# Patient Record
Sex: MALE | Race: WHITE | NOT HISPANIC OR LATINO | URBAN - NONMETROPOLITAN AREA
[De-identification: names, ages, dates, MRNs, and addresses within clinical notes are randomized per-mention and may not be internally consistent; named-entity substitution may affect disease eponyms.]

---

## 2023-03-28 ENCOUNTER — CLINICAL SUPPORT (OUTPATIENT)
Dept: FAMILY MEDICINE CLINIC | Facility: CLINIC | Age: 43
End: 2023-03-28
Payer: MEDICAID

## 2023-03-28 DIAGNOSIS — Z01.818 PREOP TESTING: Primary | ICD-10-CM

## 2023-03-28 LAB
EXPIRATION DATE: NORMAL
FLUAV AG UPPER RESP QL IA.RAPID: NOT DETECTED
FLUBV AG UPPER RESP QL IA.RAPID: NOT DETECTED
INTERNAL CONTROL: NORMAL
Lab: NORMAL
SARS-COV-2 AG UPPER RESP QL IA.RAPID: NOT DETECTED

## 2023-03-28 PROCEDURE — 87428 SARSCOV & INF VIR A&B AG IA: CPT | Performed by: FAMILY MEDICINE

## 2023-07-10 PROBLEM — I25.119 CORONARY ARTERY DISEASE INVOLVING NATIVE HEART WITH ANGINA PECTORIS: Status: ACTIVE | Noted: 2023-07-10

## 2023-07-10 NOTE — NURSING NOTE
ACC REVIEW REPORT: [facility]        PATIENT NAME: Rm Noel    PATIENT ID: [id number]        COVID-19 ACC SCREENING       DOES THE PATIENT HAVE A FEVER GREATER THAN OR EQUAL .4: no    IS THE PATIENT EXPERIENCING SHORTNESS OF BREATH: no    DOES THE PATIENT HAVE A COUGH: no  DOES THE PATIENT HAVE ANY OF THE FOLLOWING RISK FACTORS:    EXPOSURE TO SUSPECTED OR KNOWN COVID-19: no    RECENT TRAVEL HISTORY TO ENDEMIC AREA (DOMESTIC/LOCAL): no    IS THE PATIENT A HEALTHCARE WORKER: no    HAS THE PATIENT EXPERIENCED A LOSS OF SENSE OF TASTE OR SMELL: no    HAS THE PATIENT BEEN TESTED FOR COVID-19:     DATE TESTED:     LAB TESTING SENT TO:           BED: S477    BED TYPE: tele    BED GIVEN TO: pt    TIME BED GIVEN: 1815    TODAY'S DATE: 7/10/2023    TRANSFER DATE: 7/10/23    ETA: TBD    TRANSFERRING FACILITY: [facility]    TRANSFERRING FACILITY PHONE # : [phone]    TRANSFERRING MD: MARIN Hernandez    DATE/TIME REQUEST RECEIVED: 7/10/23 1825    [facility] RN: LAMIN Miller    REPORT FROM: LAMIN Gracia    TIME REPORT TAKEN: 1828    DIAGNOSIS: CAD    REASON FOR TRANSFER TO [facility]: surgery    TRANSPORTATION: EMS    CLINICAL REASON FOR TRANSFER TO [facility]: surgery      CLINICAL INFORMATION    HEIGHT: 177 cm    WEIGHT: 96 kg    ALLERGIES: NKDA    INFECTIOUS DISEASE:     ISOLATION:     VITAL SIGNS:   TIME: 1800  TEMP: 98.1  PULSE: 71  B/P: 147/77  RESP: 18        LAB INFORMATION:     CULTURE INFORMATION:     MEDS/IV FLUIDS: 20g LFA, 20g RAC      CARDIAC SYSTEM:    CHEST PAIN: not at time of report    RATE:     SCALE:     RHYTHM: sinus    Is patient taking or has patient been given any drugs that could increase bleeding? yes    DRUG: ASA     DOSE/FREQUENCY: 81 mg qd    TROPONIN:    DATE:   TIME:   TROP:     DATE:   TIME:   TROP:       HEART CATH: 7/10/23    HEART CATH DATE: 7/10/23    HEART CATH RESULTS: multiple blockages    LAD:   RCA:   CX:   LMAIN:   EF:     SWAN:     SITE:   SIZE:    DATE INSERTED:     ARTLINE:      SITE:   SIZE:   DATE INSERTED:     SHEATH:    SITE:   SIZE:   DATE INSERTED:       VASOSEAL:    SITE:   DATE INSERTED:       EXTERNAL PACEMAKER:     RATE:   EXT PACER ON:       MODE:    DATE INSERTED:   OUTPUT SETTING:   SENSITIVITY SETTING:   SENSITIVITY TYPE:       IABP:    SITE:   AUG PRESSURE:   DATE INSERTED:     CARDIAC NOTES: Patient had heart cath today.  Right wrist TR Band.  Band is currently off.  Site is CDI      RESPIRATORY SYSTEM:    LUNG SOUNDS: clear    ABG DATE:         ABG TIME:     ABG RESULTS:    PH: PCO2:   PO2:   HCO3:   O2 SAT:       ETT SIZE:     ORAL:     NASAL:     SECURED AT MEASUREMENT (CM):     ON VENTILATOR:    TV:   FI02:   RATE:   PEEP:     OXYGEN: room air    O2 SAT: 98%    IMAGING FINDINGS:     PNEUMO CHEST TUBE SEAL TYPE:     RESPIRATORY STATUS:       CNS/MUSCULOSKELETAL    ALERT AND ORIENTED:    PERSON: yes  PLACE: yes  TIME: yes    INJURY:  WHERE:     SPENCER COMA SCALE:    E:   M:   V:     STROKE SCALE:     SIZE OF HEMORRHAGE:     SYMPTOMS: (CHOOSE APPROPRIATE)    ASPHASIA:   ATAXIA:   DYSARTHRIA:   DYSPHASIA:   HEADACHE:   PARALYSIS:   SEIZURE:   SYNCOPE:   VERTIGO:   VISION CHANGE:        EXTREMITY WEAKNESS:    LEFT ARM:   RIGHT ARM:   LEFT LEG:   RIGHT LEG:     CAT SCAN RESULTS:     MRI RESULTS:     CNS/MUSCULOSKELETAL NOTES:       GI//GY      ABDOMINAL PAIN:     VOMITING:     DIARRHEA:     NAUSEA:     BOWEL SOUNDS:     OCCULT STOOL:     HEMATURIA:     NG TUBE:    SIZE:     DATE INSERTED:       ULTRASOUND RESULTS:     ACUTE ABDOMEN RESULTS:     CT SCAN RESULTS:       GI//GY NOTES:     CALE:     PAST MEDICAL HISTORY: hyperlipidemia, lymphoma as a child    OTHER SYMPTOM NOTES:     ADDITIONAL NOTES:           Angela Mancilla RN  7/10/2023  18:23 EDT

## 2023-07-11 ENCOUNTER — HOSPITAL ENCOUNTER (INPATIENT)
Facility: HOSPITAL | Age: 43
LOS: 7 days | Discharge: HOME | DRG: 236 | End: 2023-07-18
Attending: THORACIC SURGERY (CARDIOTHORACIC VASCULAR SURGERY) | Admitting: THORACIC SURGERY (CARDIOTHORACIC VASCULAR SURGERY)
Payer: MEDICAID

## 2023-07-11 DIAGNOSIS — Z95.1 S/P CABG (CORONARY ARTERY BYPASS GRAFT): Primary | ICD-10-CM

## 2023-07-11 PROBLEM — I25.10 CAD (CORONARY ARTERY DISEASE): Status: ACTIVE | Noted: 2023-07-11

## 2023-07-11 LAB
AMPHET+METHAMPHET UR QL: NEGATIVE
AMPHETAMINES UR QL: NEGATIVE
BARBITURATES UR QL SCN: NEGATIVE
BENZODIAZ UR QL SCN: NEGATIVE
BUPRENORPHINE SERPL-MCNC: NEGATIVE NG/ML
CANNABINOIDS SERPL QL: POSITIVE
COCAINE UR QL: NEGATIVE
FENTANYL UR-MCNC: NEGATIVE NG/ML
METHADONE UR QL SCN: NEGATIVE
OPIATES UR QL: NEGATIVE
OXYCODONE UR QL SCN: NEGATIVE
PCP UR QL SCN: NEGATIVE
PROPOXYPH UR QL: NEGATIVE
TRICYCLICS UR QL SCN: NEGATIVE

## 2023-07-11 PROCEDURE — 99223 1ST HOSP IP/OBS HIGH 75: CPT | Performed by: THORACIC SURGERY (CARDIOTHORACIC VASCULAR SURGERY)

## 2023-07-11 PROCEDURE — 80307 DRUG TEST PRSMV CHEM ANLYZR: CPT | Performed by: THORACIC SURGERY (CARDIOTHORACIC VASCULAR SURGERY)

## 2023-07-11 RX ORDER — ATORVASTATIN CALCIUM 40 MG/1
40 TABLET, FILM COATED ORAL NIGHTLY
Status: DISCONTINUED | OUTPATIENT
Start: 2023-07-11 | End: 2023-07-13 | Stop reason: SDUPTHER

## 2023-07-11 RX ORDER — ONDANSETRON 4 MG/1
4 TABLET, FILM COATED ORAL EVERY 6 HOURS PRN
Status: DISCONTINUED | OUTPATIENT
Start: 2023-07-11 | End: 2023-07-18 | Stop reason: HOSPADM

## 2023-07-11 RX ORDER — BISACODYL 10 MG
10 SUPPOSITORY, RECTAL RECTAL DAILY PRN
Status: DISCONTINUED | OUTPATIENT
Start: 2023-07-11 | End: 2023-07-11

## 2023-07-11 RX ORDER — ONDANSETRON 2 MG/ML
4 INJECTION INTRAMUSCULAR; INTRAVENOUS EVERY 6 HOURS PRN
Status: DISCONTINUED | OUTPATIENT
Start: 2023-07-11 | End: 2023-07-18 | Stop reason: HOSPADM

## 2023-07-11 RX ORDER — BISACODYL 5 MG/1
5 TABLET, DELAYED RELEASE ORAL DAILY PRN
Status: DISCONTINUED | OUTPATIENT
Start: 2023-07-11 | End: 2023-07-11

## 2023-07-11 RX ORDER — SODIUM CHLORIDE 0.9 % (FLUSH) 0.9 %
10 SYRINGE (ML) INJECTION AS NEEDED
Status: DISCONTINUED | OUTPATIENT
Start: 2023-07-11 | End: 2023-07-13

## 2023-07-11 RX ORDER — ATORVASTATIN CALCIUM 40 MG/1
40 TABLET, FILM COATED ORAL DAILY
COMMUNITY
Start: 2023-05-09

## 2023-07-11 RX ORDER — ASPIRIN 81 MG/1
81 TABLET ORAL DAILY
Status: DISCONTINUED | OUTPATIENT
Start: 2023-07-11 | End: 2023-07-13 | Stop reason: SDUPTHER

## 2023-07-11 RX ORDER — ACETAMINOPHEN 325 MG/1
650 TABLET ORAL EVERY 4 HOURS PRN
Status: DISCONTINUED | OUTPATIENT
Start: 2023-07-11 | End: 2023-07-18 | Stop reason: HOSPADM

## 2023-07-11 RX ORDER — SALICYLIC ACID 40 %
81 ADHESIVE PATCH, MEDICATED TOPICAL DAILY
COMMUNITY
Start: 2023-05-09 | End: 2023-07-18 | Stop reason: HOSPADM

## 2023-07-11 RX ORDER — SODIUM CHLORIDE 9 MG/ML
40 INJECTION, SOLUTION INTRAVENOUS AS NEEDED
Status: DISCONTINUED | OUTPATIENT
Start: 2023-07-11 | End: 2023-07-13

## 2023-07-11 RX ORDER — SODIUM CHLORIDE 0.9 % (FLUSH) 0.9 %
10 SYRINGE (ML) INJECTION EVERY 12 HOURS SCHEDULED
Status: DISCONTINUED | OUTPATIENT
Start: 2023-07-11 | End: 2023-07-13

## 2023-07-11 RX ORDER — POLYETHYLENE GLYCOL 3350 17 G/17G
17 POWDER, FOR SOLUTION ORAL DAILY PRN
Status: DISCONTINUED | OUTPATIENT
Start: 2023-07-11 | End: 2023-07-11

## 2023-07-11 RX ORDER — AMOXICILLIN 250 MG
2 CAPSULE ORAL 2 TIMES DAILY
Status: DISCONTINUED | OUTPATIENT
Start: 2023-07-11 | End: 2023-07-11

## 2023-07-11 RX ADMIN — ATORVASTATIN CALCIUM 40 MG: 40 TABLET, FILM COATED ORAL at 21:16

## 2023-07-11 RX ADMIN — Medication 10 ML: at 21:15

## 2023-07-11 RX ADMIN — Medication 10 ML: at 08:36

## 2023-07-11 RX ADMIN — ASPIRIN 81 MG: 81 TABLET, COATED ORAL at 08:36

## 2023-07-11 NOTE — PLAN OF CARE
Goal Outcome Evaluation:              Outcome Evaluation: pt axo, vss, no complaints of chest pain, room air, no complaints of pain, awiaing cabg.

## 2023-07-11 NOTE — CASE MANAGEMENT/SOCIAL WORK
Discharge Planning Assessment  [facility]     Patient Name: Rm Noel  MRN: [id number]  Today's Date: 7/11/2023    Admit Date: 7/11/2023    Plan: Home   Discharge Needs Assessment       Row Name 07/11/23 1042       Living Environment    People in Home child(jacinda), adult    Current Living Arrangements home    Potentially Unsafe Housing Conditions none    Primary Care Provided by self    Provides Primary Care For no one    Family Caregiver if Needed child(jacinda), adult    Living Arrangement Comments LIves in [facility] w/daughter in  a home.       Resource/Environmental Concerns    Resource/Environmental Concerns none       Food Insecurity    Within the past 12 months, you worried that your food would run out before you got the money to buy more. Never true    Within the past 12 months, the food you bought just didn't last and you didn't have money to get more. Never true       Transition Planning    Patient/Family Anticipates Transition to home with family    Transportation Anticipated family or friend will provide       Discharge Needs Assessment    Equipment Currently Used at Home cpap                   Discharge Plan       Row Name 07/11/23 1044       Plan    Plan Home    Patient/Family in Agreement with Plan yes    Plan Comments I met w/Mr. Noel and daughter @ the bedside to initiate d/c plan. Insurance confirmed. Fills scripts w/no issues @ [facility]. Not current [facility]. Uses a Cpap @ baseline. Has PCP Rigoberto Solano. CABG planned. D/C plan @ this time is home w/family. No d/c needs @ this time. CM will continue to follow thru admission.    Final Discharge Disposition Code 01 - home or self-care                  Continued Care and Services - Admitted Since 7/11/2023    Coordination has not been started for this encounter.       Expected Discharge Date and Time       Expected Discharge Date Expected Discharge Time    Jul 19, 2023            Demographic Summary       Row Name 07/11/23 1041       General  Information    Arrived From hospital    Referral Source emergency department    Preferred Language English    General Information Comments No POA/LW paperwork on file. PCP is Rigobertomaureen Solano                   Functional Status       Row Name 07/11/23 1042       Functional Status    Usual Activity Tolerance good    Current Activity Tolerance moderate       Physical Activity    On average, how many days per week do you engage in moderate to strenuous exercise (like a brisk walk)? 5 days    On average, how many minutes do you engage in exercise at this level? 20 min    Number of minutes of exercise per week 100       Functional Status, IADL    Medications independent    Meal Preparation independent    Housekeeping independent    Laundry independent    Shopping independent       Mental Status    General Appearance WDL WDL       Employment/    Employment Status employed full-time                   Psychosocial    No documentation.                  Abuse/Neglect    No documentation.                  Legal    No documentation.                  Substance Abuse    No documentation.                  Patient Forms    No documentation.                     Lyle Soto RN

## 2023-07-11 NOTE — H&P
Cardiothoracic Surgery History & Physical           Chief complaint: Chest pain    HPI: Rm Noel is a 43-year-old current smoker with no past medical history that he is aware of who presents as a transfer from [facility] for coronary artery disease.  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  He started to see his PCP who ordered a cardiac work-up.  After an abnormal stress test, he was referred for cardiac cath.  Cardiac cath showed 95 to 99% proximal RCA stenosis with 100% occluded middle segment of RCA, 40% proximal stenosis of the LAD with 100% mid segment occlusion, first diagonal with 90% proximal stenosis, second obtuse marginal with 90% stenosis.  Patient was then transferred to [facility] for possible CABG with Dr. Hernandez.  Patient states that he has not had any further episodes of chest pain since his initial episode in November.  He denies any lower extremity edema.  He denies any history of radiation to the chest.  He does report a cosmetic procedure to his right chest when he was a child.      History reviewed. No pertinent past medical history.  History reviewed. No pertinent family history.  Social History     Tobacco Use    Smoking status: Every Day     Packs/day: 2.00     Years: 15.00     Pack years: 30.00     Types: Cigarettes     Start date: 7/1/1994    Smokeless tobacco: Never   Vaping Use    Vaping Use: Never used   Substance Use Topics    Alcohol use: Never    Drug use: Yes     Types: Marijuana       Medications Prior to Admission   Medication Sig Dispense Refill Last Dose    atorvastatin (LIPITOR) 40 MG tablet Take 1 tablet by mouth Daily.   Past Week    [facility] Aspirin Low Dose 81 MG EC tablet Take 1 tablet by mouth Daily.   Past Week     Allergies:  Patient has no known allergies.    Review of Systems:  A comprehensive review of systems was negative except for:   \  Cardiovascular: Chest pain as  described in HPI  All other systems were reviewed and were negative.    Vital Signs:  Temp:  [97.8 °F (36.6 °C)] 97.8 °F (36.6 °C)  Heart Rate:  [61-72] 61  Resp:  [18] 18  BP: (155)/(91) 155/91    Physical Exam:  Physical Exam  Constitutional:       Appearance: Normal appearance.   HENT:      Head: Normocephalic and atraumatic.   Eyes:      Extraocular Movements: Extraocular movements intact.   Cardiovascular:      Rate and Rhythm: Normal rate and regular rhythm.      Pulses:           Dorsalis pedis pulses are 2+ on the right side and 2+ on the left side.        Posterior tibial pulses are 2+ on the right side and 2+ on the left side.      Heart sounds: Normal heart sounds. No murmur heard.    No friction rub.   Pulmonary:      Effort: Pulmonary effort is normal. No respiratory distress.      Breath sounds: No wheezing or rhonchi.   Abdominal:      General: There is no distension.      Palpations: Abdomen is soft.      Tenderness: There is no abdominal tenderness.   Musculoskeletal:      Right lower leg: No edema.      Left lower leg: No edema.   Neurological:      Mental Status: He is alert.           Imaging:   Cardiac cath images uploaded in chart      Assessment:     CAD (coronary artery disease)         Plan:   Plan for CABG with Dr. Hernandez  Patient will need preoperative echo and carotid duplex and PFTs       ERIKA Hernandez agree the above I saw this patient and evaluated the patient and reviewed the heart catheterization.  Also discussed the case with his cardiologist.  I agree his best option at his age with his disease is coronary bypass grafting surgery.  Patient is agreeable to proceed he is aware of the risk of stroke bleeding infection death renal failure and agrees to proceed      Eelni Ulrich PA-C  07/11/23  07:21 EDT

## 2023-07-12 ENCOUNTER — APPOINTMENT (OUTPATIENT)
Dept: CARDIOLOGY | Facility: HOSPITAL | Age: 43
DRG: 236 | End: 2023-07-12
Payer: MEDICAID

## 2023-07-12 ENCOUNTER — APPOINTMENT (OUTPATIENT)
Dept: GENERAL RADIOLOGY | Facility: HOSPITAL | Age: 43
DRG: 236 | End: 2023-07-12
Payer: MEDICAID

## 2023-07-12 ENCOUNTER — APPOINTMENT (OUTPATIENT)
Dept: PULMONOLOGY | Facility: HOSPITAL | Age: 43
DRG: 236 | End: 2023-07-12
Payer: MEDICAID

## 2023-07-12 LAB
ABO GROUP BLD: NORMAL
ABO GROUP BLD: NORMAL
ALBUMIN SERPL-MCNC: 4.6 G/DL (ref 3.5–5.2)
ALBUMIN/GLOB SERPL: 1.5 G/DL
ALP SERPL-CCNC: 87 U/L (ref 39–117)
ALT SERPL W P-5'-P-CCNC: 32 U/L (ref 1–41)
ANION GAP SERPL CALCULATED.3IONS-SCNC: 13 MMOL/L (ref 5–15)
APTT PPP: 33.9 SECONDS (ref 22–39)
ASCENDING AORTA: 2.7 CM
AST SERPL-CCNC: 23 U/L (ref 1–40)
BH CV ECHO LEFT VENTRICLE MID CAVITARY GRADIENT: 8 MMHG
BH CV ECHO MEAS - AO MAX PG: 8.6 MMHG
BH CV ECHO MEAS - AO MEAN PG: 4.3 MMHG
BH CV ECHO MEAS - AO ROOT DIAM: 3.3 CM
BH CV ECHO MEAS - AO V2 MAX: 146.7 CM/SEC
BH CV ECHO MEAS - AO V2 VTI: 25.5 CM
BH CV ECHO MEAS - AVA(I,D): 2.6 CM2
BH CV ECHO MEAS - EDV(CUBED): 47.3 ML
BH CV ECHO MEAS - EDV(MOD-SP2): 84.8 ML
BH CV ECHO MEAS - EDV(MOD-SP4): 94.7 ML
BH CV ECHO MEAS - EF(MOD-BP): 60 %
BH CV ECHO MEAS - EF(MOD-SP2): 62.7 %
BH CV ECHO MEAS - EF(MOD-SP4): 55.6 %
BH CV ECHO MEAS - ESV(CUBED): 15.2 ML
BH CV ECHO MEAS - ESV(MOD-SP2): 31.7 ML
BH CV ECHO MEAS - ESV(MOD-SP4): 42.1 ML
BH CV ECHO MEAS - FS: 31.5 %
BH CV ECHO MEAS - IVS/LVPW: 0.91 CM
BH CV ECHO MEAS - IVSD: 1.28 CM
BH CV ECHO MEAS - LA DIMENSION: 3.5 CM
BH CV ECHO MEAS - LAT PEAK E' VEL: 10 CM/SEC
BH CV ECHO MEAS - LV DIASTOLIC VOL/BSA (35-75): 44.8 CM2
BH CV ECHO MEAS - LV MASS(C)D: 169.8 GRAMS
BH CV ECHO MEAS - LV MAX PG: 5.2 MMHG
BH CV ECHO MEAS - LV MEAN PG: 2.6 MMHG
BH CV ECHO MEAS - LV SYSTOLIC VOL/BSA (12-30): 19.9 CM2
BH CV ECHO MEAS - LV V1 MAX: 113.6 CM/SEC
BH CV ECHO MEAS - LV V1 VTI: 20.6 CM
BH CV ECHO MEAS - LVIDD: 3.6 CM
BH CV ECHO MEAS - LVIDS: 2.48 CM
BH CV ECHO MEAS - LVOT AREA: 3.3 CM2
BH CV ECHO MEAS - LVOT DIAM: 2.04 CM
BH CV ECHO MEAS - LVPWD: 1.41 CM
BH CV ECHO MEAS - MED PEAK E' VEL: 7 CM/SEC
BH CV ECHO MEAS - MV A MAX VEL: 55.3 CM/SEC
BH CV ECHO MEAS - MV DEC SLOPE: 143.8 CM/SEC2
BH CV ECHO MEAS - MV DEC TIME: 0.38 MSEC
BH CV ECHO MEAS - MV E MAX VEL: 54.4 CM/SEC
BH CV ECHO MEAS - MV E/A: 0.98
BH CV ECHO MEAS - MV MAX PG: 2.17 MMHG
BH CV ECHO MEAS - MV MEAN PG: 1 MMHG
BH CV ECHO MEAS - MV P1/2T: 114 MSEC
BH CV ECHO MEAS - MV V2 VTI: 23.8 CM
BH CV ECHO MEAS - MVA(VTI): 2.8 CM2
BH CV ECHO MEAS - PA ACC TIME: 0.19 SEC
BH CV ECHO MEAS - PA V2 MAX: 110.9 CM/SEC
BH CV ECHO MEAS - RAP SYSTOLE: 3 MMHG
BH CV ECHO MEAS - RVSP: 22.3 MMHG
BH CV ECHO MEAS - SI(MOD-SP2): 25.2 ML/M2
BH CV ECHO MEAS - SI(MOD-SP4): 24.9 ML/M2
BH CV ECHO MEAS - SV(LVOT): 67.4 ML
BH CV ECHO MEAS - SV(MOD-SP2): 53.2 ML
BH CV ECHO MEAS - SV(MOD-SP4): 52.6 ML
BH CV ECHO MEAS - TAPSE (>1.6): 1.8 CM
BH CV ECHO MEAS - TR MAX PG: 19.3 MMHG
BH CV ECHO MEAS - TR MAX VEL: 219.8 CM/SEC
BH CV ECHO MEASUREMENTS AVERAGE E/E' RATIO: 6.4
BH CV VAS BP LEFT ARM: NORMAL MMHG
BH CV XLRA - RV BASE: 3.5 CM
BH CV XLRA - RV LENGTH: 7.9 CM
BH CV XLRA - RV MID: 3.5 CM
BH CV XLRA - TDI S': 17 CM/SEC
BH CV XLRA MEAS LEFT DIST CCA EDV: 34 CM/SEC
BH CV XLRA MEAS LEFT DIST CCA PSV: 149 CM/SEC
BH CV XLRA MEAS LEFT DIST ICA EDV: 29.1 CM/SEC
BH CV XLRA MEAS LEFT DIST ICA PSV: 76.8 CM/SEC
BH CV XLRA MEAS LEFT ICA/CCA RATIO: 0.54
BH CV XLRA MEAS LEFT MID CCA EDV: 32.9 CM/SEC
BH CV XLRA MEAS LEFT MID CCA PSV: 173 CM/SEC
BH CV XLRA MEAS LEFT MID ICA EDV: 35.3 CM/SEC
BH CV XLRA MEAS LEFT MID ICA PSV: 84.7 CM/SEC
BH CV XLRA MEAS LEFT PROX CCA EDV: 32.9 CM/SEC
BH CV XLRA MEAS LEFT PROX CCA PSV: 172 CM/SEC
BH CV XLRA MEAS LEFT PROX ECA EDV: 22.4 CM/SEC
BH CV XLRA MEAS LEFT PROX ECA PSV: 123 CM/SEC
BH CV XLRA MEAS LEFT PROX ICA EDV: 29 CM/SEC
BH CV XLRA MEAS LEFT PROX ICA PSV: 93.3 CM/SEC
BH CV XLRA MEAS LEFT PROX SCLA PSV: 285 CM/SEC
BH CV XLRA MEAS LEFT VERTEBRAL A EDV: 13.7 CM/SEC
BH CV XLRA MEAS LEFT VERTEBRAL A PSV: 83.4 CM/SEC
BH CV XLRA MEAS RIGHT DIST CCA EDV: 27.1 CM/SEC
BH CV XLRA MEAS RIGHT DIST CCA PSV: 131 CM/SEC
BH CV XLRA MEAS RIGHT DIST ICA EDV: 25 CM/SEC
BH CV XLRA MEAS RIGHT DIST ICA PSV: 71.1 CM/SEC
BH CV XLRA MEAS RIGHT ICA/CCA RATIO: 0.72
BH CV XLRA MEAS RIGHT MID CCA EDV: 26.1 CM/SEC
BH CV XLRA MEAS RIGHT MID CCA PSV: 151 CM/SEC
BH CV XLRA MEAS RIGHT MID ICA EDV: 29.4 CM/SEC
BH CV XLRA MEAS RIGHT MID ICA PSV: 87.6 CM/SEC
BH CV XLRA MEAS RIGHT PROX CCA EDV: 30.5 CM/SEC
BH CV XLRA MEAS RIGHT PROX CCA PSV: 165 CM/SEC
BH CV XLRA MEAS RIGHT PROX ECA EDV: 23.2 CM/SEC
BH CV XLRA MEAS RIGHT PROX ECA PSV: 176 CM/SEC
BH CV XLRA MEAS RIGHT PROX ICA EDV: 32.9 CM/SEC
BH CV XLRA MEAS RIGHT PROX ICA PSV: 109 CM/SEC
BH CV XLRA MEAS RIGHT PROX SCLA PSV: 273 CM/SEC
BH CV XLRA MEAS RIGHT VERTEBRAL A EDV: 14.3 CM/SEC
BH CV XLRA MEAS RIGHT VERTEBRAL A PSV: 61.9 CM/SEC
BILIRUB SERPL-MCNC: 0.7 MG/DL (ref 0–1.2)
BLD GP AB SCN SERPL QL: NEGATIVE
BUN SERPL-MCNC: 12 MG/DL (ref 6–20)
BUN/CREAT SERPL: 15 (ref 7–25)
CALCIUM SPEC-SCNC: 9.4 MG/DL (ref 8.6–10.5)
CHLORIDE SERPL-SCNC: 103 MMOL/L (ref 98–107)
CHOLEST SERPL-MCNC: 165 MG/DL (ref 0–200)
CO2 SERPL-SCNC: 22 MMOL/L (ref 22–29)
CREAT SERPL-MCNC: 0.8 MG/DL (ref 0.76–1.27)
DEPRECATED RDW RBC AUTO: 40.5 FL (ref 37–54)
EGFRCR SERPLBLD CKD-EPI 2021: 111.2 ML/MIN/1.73
ERYTHROCYTE [DISTWIDTH] IN BLOOD BY AUTOMATED COUNT: 13 % (ref 12.3–15.4)
GLOBULIN UR ELPH-MCNC: 3 GM/DL
GLUCOSE BLDC GLUCOMTR-MCNC: 117 MG/DL (ref 70–130)
GLUCOSE BLDC GLUCOMTR-MCNC: 124 MG/DL (ref 70–130)
GLUCOSE BLDC GLUCOMTR-MCNC: 142 MG/DL (ref 70–130)
GLUCOSE BLDC GLUCOMTR-MCNC: 85 MG/DL (ref 70–130)
GLUCOSE SERPL-MCNC: 167 MG/DL (ref 65–99)
HBA1C MFR BLD: 5.8 % (ref 4.8–5.6)
HCT VFR BLD AUTO: 45.3 % (ref 37.5–51)
HDLC SERPL-MCNC: 30 MG/DL (ref 40–60)
HGB BLD-MCNC: 14.7 G/DL (ref 13–17.7)
INR PPP: 0.96 (ref 0.89–1.12)
IVRT: 101 MSEC
LDLC SERPL CALC-MCNC: 109 MG/DL (ref 0–100)
LDLC/HDLC SERPL: 3.55 {RATIO}
LEFT ARM BP: NORMAL MMHG
LEFT ATRIUM VOLUME INDEX: 24 ML/M2
MCH RBC QN AUTO: 27.9 PG (ref 26.6–33)
MCHC RBC AUTO-ENTMCNC: 32.5 G/DL (ref 31.5–35.7)
MCV RBC AUTO: 86 FL (ref 79–97)
PA ADP PRP-ACNC: 139 PRU
PLATELET # BLD AUTO: 253 10*3/MM3 (ref 140–450)
PMV BLD AUTO: 9.4 FL (ref 6–12)
POTASSIUM SERPL-SCNC: 4.3 MMOL/L (ref 3.5–5.2)
PROT SERPL-MCNC: 7.6 G/DL (ref 6–8.5)
PROTHROMBIN TIME: 12.8 SECONDS (ref 12.2–14.5)
RBC # BLD AUTO: 5.27 10*6/MM3 (ref 4.14–5.8)
RH BLD: NEGATIVE
RH BLD: NEGATIVE
SODIUM SERPL-SCNC: 138 MMOL/L (ref 136–145)
T&S EXPIRATION DATE: NORMAL
TRIGL SERPL-MCNC: 143 MG/DL (ref 0–150)
VLDLC SERPL-MCNC: 26 MG/DL (ref 5–40)
WBC NRBC COR # BLD: 8.89 10*3/MM3 (ref 3.4–10.8)

## 2023-07-12 PROCEDURE — 80053 COMPREHEN METABOLIC PANEL: CPT | Performed by: STUDENT IN AN ORGANIZED HEALTH CARE EDUCATION/TRAINING PROGRAM

## 2023-07-12 PROCEDURE — 94010 BREATHING CAPACITY TEST: CPT | Performed by: INTERNAL MEDICINE

## 2023-07-12 PROCEDURE — 94010 BREATHING CAPACITY TEST: CPT

## 2023-07-12 PROCEDURE — 93880 EXTRACRANIAL BILAT STUDY: CPT | Performed by: INTERNAL MEDICINE

## 2023-07-12 PROCEDURE — 99024 POSTOP FOLLOW-UP VISIT: CPT | Performed by: THORACIC SURGERY (CARDIOTHORACIC VASCULAR SURGERY)

## 2023-07-12 PROCEDURE — 82948 REAGENT STRIP/BLOOD GLUCOSE: CPT

## 2023-07-12 PROCEDURE — 85576 BLOOD PLATELET AGGREGATION: CPT | Performed by: STUDENT IN AN ORGANIZED HEALTH CARE EDUCATION/TRAINING PROGRAM

## 2023-07-12 PROCEDURE — 85730 THROMBOPLASTIN TIME PARTIAL: CPT | Performed by: STUDENT IN AN ORGANIZED HEALTH CARE EDUCATION/TRAINING PROGRAM

## 2023-07-12 PROCEDURE — 86901 BLOOD TYPING SEROLOGIC RH(D): CPT | Performed by: STUDENT IN AN ORGANIZED HEALTH CARE EDUCATION/TRAINING PROGRAM

## 2023-07-12 PROCEDURE — 93010 ELECTROCARDIOGRAM REPORT: CPT | Performed by: INTERNAL MEDICINE

## 2023-07-12 PROCEDURE — 93005 ELECTROCARDIOGRAM TRACING: CPT | Performed by: STUDENT IN AN ORGANIZED HEALTH CARE EDUCATION/TRAINING PROGRAM

## 2023-07-12 PROCEDURE — 86900 BLOOD TYPING SEROLOGIC ABO: CPT | Performed by: STUDENT IN AN ORGANIZED HEALTH CARE EDUCATION/TRAINING PROGRAM

## 2023-07-12 PROCEDURE — 86901 BLOOD TYPING SEROLOGIC RH(D): CPT

## 2023-07-12 PROCEDURE — 93880 EXTRACRANIAL BILAT STUDY: CPT

## 2023-07-12 PROCEDURE — 80061 LIPID PANEL: CPT | Performed by: STUDENT IN AN ORGANIZED HEALTH CARE EDUCATION/TRAINING PROGRAM

## 2023-07-12 PROCEDURE — 93306 TTE W/DOPPLER COMPLETE: CPT | Performed by: INTERNAL MEDICINE

## 2023-07-12 PROCEDURE — 83036 HEMOGLOBIN GLYCOSYLATED A1C: CPT | Performed by: STUDENT IN AN ORGANIZED HEALTH CARE EDUCATION/TRAINING PROGRAM

## 2023-07-12 PROCEDURE — 71045 X-RAY EXAM CHEST 1 VIEW: CPT

## 2023-07-12 PROCEDURE — 85027 COMPLETE CBC AUTOMATED: CPT | Performed by: STUDENT IN AN ORGANIZED HEALTH CARE EDUCATION/TRAINING PROGRAM

## 2023-07-12 PROCEDURE — 86923 COMPATIBILITY TEST ELECTRIC: CPT

## 2023-07-12 PROCEDURE — 85610 PROTHROMBIN TIME: CPT | Performed by: STUDENT IN AN ORGANIZED HEALTH CARE EDUCATION/TRAINING PROGRAM

## 2023-07-12 PROCEDURE — 86900 BLOOD TYPING SEROLOGIC ABO: CPT

## 2023-07-12 PROCEDURE — 93306 TTE W/DOPPLER COMPLETE: CPT

## 2023-07-12 PROCEDURE — 86850 RBC ANTIBODY SCREEN: CPT | Performed by: STUDENT IN AN ORGANIZED HEALTH CARE EDUCATION/TRAINING PROGRAM

## 2023-07-12 RX ORDER — IPRATROPIUM BROMIDE AND ALBUTEROL SULFATE 2.5; .5 MG/3ML; MG/3ML
3 SOLUTION RESPIRATORY (INHALATION) EVERY 4 HOURS PRN
Status: DISCONTINUED | OUTPATIENT
Start: 2023-07-12 | End: 2023-07-18 | Stop reason: HOSPADM

## 2023-07-12 RX ORDER — CHLORHEXIDINE GLUCONATE 0.12 MG/ML
15 RINSE ORAL EVERY 12 HOURS
Status: COMPLETED | OUTPATIENT
Start: 2023-07-12 | End: 2023-07-13

## 2023-07-12 RX ORDER — SODIUM CHLORIDE 0.9 % (FLUSH) 0.9 %
10 SYRINGE (ML) INJECTION EVERY 12 HOURS SCHEDULED
Status: DISCONTINUED | OUTPATIENT
Start: 2023-07-12 | End: 2023-07-13

## 2023-07-12 RX ORDER — CHLORHEXIDINE GLUCONATE 500 MG/1
CLOTH TOPICAL EVERY 12 HOURS SCHEDULED
Status: DISCONTINUED | OUTPATIENT
Start: 2023-07-12 | End: 2023-07-13

## 2023-07-12 RX ORDER — SODIUM CHLORIDE 9 MG/ML
40 INJECTION, SOLUTION INTRAVENOUS AS NEEDED
Status: DISCONTINUED | OUTPATIENT
Start: 2023-07-12 | End: 2023-07-13

## 2023-07-12 RX ORDER — CALCIUM CARBONATE 500 MG/1
2 TABLET, CHEWABLE ORAL 3 TIMES DAILY PRN
Status: DISCONTINUED | OUTPATIENT
Start: 2023-07-12 | End: 2023-07-18 | Stop reason: HOSPADM

## 2023-07-12 RX ORDER — CHLORHEXIDINE GLUCONATE 0.12 MG/ML
15 RINSE ORAL ONCE
Status: CANCELLED | OUTPATIENT
Start: 2023-07-12 | End: 2023-07-12

## 2023-07-12 RX ORDER — SODIUM CHLORIDE 0.9 % (FLUSH) 0.9 %
10 SYRINGE (ML) INJECTION AS NEEDED
Status: DISCONTINUED | OUTPATIENT
Start: 2023-07-12 | End: 2023-07-13

## 2023-07-12 RX ORDER — CHLORHEXIDINE GLUCONATE 0.12 MG/ML
15 RINSE ORAL EVERY 12 HOURS
Status: DISCONTINUED | OUTPATIENT
Start: 2023-07-12 | End: 2023-07-12

## 2023-07-12 RX ORDER — CHLORHEXIDINE GLUCONATE 500 MG/1
CLOTH TOPICAL EVERY 12 HOURS PRN
Status: DISCONTINUED | OUTPATIENT
Start: 2023-07-12 | End: 2023-07-12

## 2023-07-12 RX ADMIN — ASPIRIN 81 MG: 81 TABLET, COATED ORAL at 10:14

## 2023-07-12 RX ADMIN — ATORVASTATIN CALCIUM 40 MG: 40 TABLET, FILM COATED ORAL at 20:01

## 2023-07-12 RX ADMIN — Medication 10 ML: at 20:05

## 2023-07-12 RX ADMIN — CHLORHEXIDINE GLUCONATE 15 ML: 1.2 SOLUTION ORAL at 20:01

## 2023-07-12 RX ADMIN — Medication 10 ML: at 10:14

## 2023-07-12 RX ADMIN — Medication 12.5 MG: at 20:00

## 2023-07-12 RX ADMIN — MUPIROCIN 1 APPLICATION: 20 OINTMENT TOPICAL at 20:01

## 2023-07-12 RX ADMIN — CALCIUM CARBONATE (ANTACID) CHEW TAB 500 MG 2 TABLET: 500 CHEW TAB at 20:01

## 2023-07-12 NOTE — CASE MANAGEMENT/SOCIAL WORK
Continued Stay Note  [facility]     Patient Name: Rm Noel  MRN: [id number]  Today's Date: 7/12/2023    Admit Date: 7/11/2023    Plan: Home   Discharge Plan       Row Name 07/12/23 1202       Plan    Plan Home    Patient/Family in Agreement with Plan yes    Plan Comments I met Mr. Noel in hallway ambulating, plan is for CABG tomorrow, Thursday. No d/c needs verbalized @ this time. CM will continue to follow.    Final Discharge Disposition Code 01 - home or self-care                   Discharge Codes    No documentation.                 Expected Discharge Date and Time       Expected Discharge Date Expected Discharge Time    Jul 19, 2023               Lyle Soto RN

## 2023-07-12 NOTE — PLAN OF CARE
Goal Outcome Evaluation:  Plan of Care Reviewed With: patient, significant other        Progress: no change  Outcome Evaluation: Vital signs wnl. Oxygen level greater than 90% on room air. No complaints of chest pain or shortness of breath. Patient has walked in hallway several times today without complications. With permission patient was allowed to go outside with significant other and patient allowed to shower. Patient off floor for an hour without complications. Surgery scheduled for tomorrow.

## 2023-07-12 NOTE — PAYOR COMM NOTE
Ref# [id number]  Transfer from [facility] to [facility] for CABG    Utilization Review  Phone [phone]  Fax [phone]    [place] [address]           Rm Ricks (43 y.o. Male)       Date of Birth   1980    Social Security Number       Address   [address] [place] [address]    Home Phone   [phone]    MRN   [id number]       M[employer]on   None    Marital Status   Single                            Admission Date   7/11/23    Admission Type   Urgent    Admitting Provider   Florencio Hernandez MD    Attending Provider   Florencio Hernandez MD    Department, Room/Bed   [facility] 4H, S477/1       Discharge Date       Discharge Disposition       Discharge Destination                                 Attending Provider: Florencio Hernandez MD    Allergies: No Known Allergies    Isolation: None   Infection: None   Code Status: Not on file    Ht: --   Wt: 96.1 kg (211 lb 12.8 oz)    Admission Cmt: None   Principal Problem: CAD (coronary artery disease) [I25.10]                   Active Insurance as of 7/11/2023       Primary Coverage       Payor Plan Insurance Group Employer/Plan Group    [place] [id number]       Payor Plan Address Payor Plan Phone Number Payor Plan Fax Number Effective Dates    [employer] [address] [address] [phone]  1/1/2021 - None Entered    [place] [address]         Subscriber Name Subscriber Birth Date Member ID       RM RICKS 1980 [id number]                     Emergency Contacts        (Rel.) Home Phone Work Phone Mobile Phone    KAIN SOLORZANO (Mother) [phone] -- --    Marilee Smith (Significant Other) -- -- [phone]              [place]: NPI [id number] Tax ID [id number]  Insurance Information                  [place] Phone: [phone]    Subscriber: Rm Ricks Subscriber#: [id number]    Group#:  [id number] Precert#: --             History & Physical        Florencio Hernandez MD at 07/11/23 0712          Cardiothoracic Surgery History & Physical           Chief complaint: Chest pain    HPI: Rm Noel is a 43-year-old current smoker with no past medical history that he is aware of who presents as a transfer from [facility] for coronary artery disease.  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  He started to see his PCP who ordered a cardiac work-up.  After an abnormal stress test, he was referred for cardiac cath.  Cardiac cath showed multivessel coronary artery disease.  Patient was then transferred to [facility] for possible CABG with Dr. Hernandez.  Patient states that he has not had any further episodes of chest pain since his initial episode in November.  He denies any lower extremity edema.  He denies any history of radiation to the chest.  He does report a cosmetic procedure to his right chest when he was a child.      History reviewed. No pertinent past medical history.  History reviewed. No pertinent family history.  Social History     Tobacco Use    Smoking status: Every Day     Packs/day: 2.00     Years: 15.00     Pack years: 30.00     Types: Cigarettes     Start date: 7/1/1994    Smokeless tobacco: Never   Vaping Use    Vaping Use: Never used   Substance Use Topics    Alcohol use: Never    Drug use: Yes     Types: Marijuana       Medications Prior to Admission   Medication Sig Dispense Refill Last Dose    atorvastatin (LIPITOR) 40 MG tablet Take 1 tablet by mouth Daily.   Past Week    [facility] Aspirin Low Dose 81 MG EC tablet Take 1 tablet by mouth Daily.   Past Week     Allergies:  Patient has no known allergies.    Review of Systems:  A comprehensive review of systems was negative except for:   \  Cardiovascular: Chest pain as described in HPI  All other systems were reviewed and were negative.    Vital  Signs:  Temp:  [97.8 °F (36.6 °C)] 97.8 °F (36.6 °C)  Heart Rate:  [61-72] 61  Resp:  [18] 18  BP: (155)/(91) 155/91    Physical Exam:  Physical Exam  Constitutional:       Appearance: Normal appearance.   HENT:      Head: Normocephalic and atraumatic.   Eyes:      Extraocular Movements: Extraocular movements intact.   Cardiovascular:      Rate and Rhythm: Normal rate and regular rhythm.      Pulses:           Dorsalis pedis pulses are 2+ on the right side and 2+ on the left side.        Posterior tibial pulses are 2+ on the right side and 2+ on the left side.      Heart sounds: Normal heart sounds. No murmur heard.    No friction rub.   Pulmonary:      Effort: Pulmonary effort is normal. No respiratory distress.      Breath sounds: No wheezing or rhonchi.   Abdominal:      General: There is no distension.      Palpations: Abdomen is soft.      Tenderness: There is no abdominal tenderness.   Musculoskeletal:      Right lower leg: No edema.      Left lower leg: No edema.   Neurological:      Mental Status: He is alert.           Imaging:   Cardiac cath images uploaded in chart      Assessment:     CAD (coronary artery disease)         Plan:   Plan for CABG with Dr. Hernandez  Patient will need preoperative echo and carotid duplex and PFTs       I Florencio Hernandez agree the above I saw this patient and evaluated the patient and reviewed the heart catheterization.  Also discussed the case with his cardiologist.  I agree his best option at his age with his disease is coronary bypass grafting surgery.  Patient is agreeable to proceed he is aware of the risk of stroke bleeding infection death renal failure and agrees to proceed      Eleni Ulrich PA-C  07/11/23  07:21 EDT             Electronically signed by Florencio Hernandez MD at 07/11/23 1248          Physician Progress Notes (all)        Florencio Hernandez MD at 07/12/23 1804          CTS Progress Note       LOS: 1 day   Patient Care Team:  Provider, No Known as  PCP - General    Chief Complaint: CAD (coronary artery disease)    Vital Signs:  Temp:  [97.4 °F (36.3 °C)-98.3 °F (36.8 °C)] 97.4 °F (36.3 °C)  Heart Rate:  [56-79] 60  Resp:  [16-18] 18  BP: (126-145)/(73-92) 129/79    Physical Exam: No further anginal symptoms       Results:                 Imaging Results (Last 24 Hours)       Procedure Component Value Units Date/Time    XR Chest 1 View [[id number]] Resulted: 07/12/23 0433     Updated: 07/12/23 0434            Assessment      CAD (coronary artery disease)    Coronary surgery discussed with patient and he is agreeable to proceed.  We will calculate STS mortality risk database    Plan   Preop for coronary bypass grafting surgery for tomorrow    Please note that portions of this note were completed with a voice recognition program. Efforts were made to edit the dictations, but occasionally words are mistranscribed.    Florencio Hernandez MD  07/12/23  05:55 EDT        Electronically signed by Florencio Hernandez MD at 07/12/23 0556       Consult Notes (all)    No notes of this type exist for this encounter.

## 2023-07-12 NOTE — PROGRESS NOTES
CTS Progress Note       LOS: 1 day   Patient Care Team:  Provider, No Known as PCP - General    Chief Complaint: CAD (coronary artery disease)    Vital Signs:  Temp:  [97.4 °F (36.3 °C)-98.3 °F (36.8 °C)] 97.4 °F (36.3 °C)  Heart Rate:  [56-79] 60  Resp:  [16-18] 18  BP: (126-145)/(73-92) 129/79    Physical Exam: No further anginal symptoms       Results:                 Imaging Results (Last 24 Hours)       Procedure Component Value Units Date/Time    XR Chest 1 View [[id number]] Resulted: 07/12/23 0433     Updated: 07/12/23 0434            Assessment      CAD (coronary artery disease)    Coronary surgery discussed with patient and he is agreeable to proceed.  We will calculate STS mortality risk database    Plan   Preop for coronary bypass grafting surgery for tomorrow    Please note that portions of this note were completed with a voice recognition program. Efforts were made to edit the dictations, but occasionally words are mistranscribed.    Florencio Hernandez MD  07/12/23  05:55 EDT

## 2023-07-13 ENCOUNTER — ANESTHESIA EVENT CONVERTED (OUTPATIENT)
Dept: ANESTHESIOLOGY | Facility: HOSPITAL | Age: 43
DRG: 236 | End: 2023-07-13
Payer: MEDICAID

## 2023-07-13 ENCOUNTER — APPOINTMENT (OUTPATIENT)
Dept: GENERAL RADIOLOGY | Facility: HOSPITAL | Age: 43
DRG: 236 | End: 2023-07-13
Payer: MEDICAID

## 2023-07-13 PROBLEM — J44.9 COPD MIXED TYPE: Status: ACTIVE | Noted: 2023-07-13

## 2023-07-13 PROBLEM — Z72.0 TOBACCO ABUSE: Status: ACTIVE | Noted: 2023-07-13

## 2023-07-13 LAB
ALBUMIN SERPL-MCNC: 3.6 G/DL (ref 3.5–5.2)
ALBUMIN SERPL-MCNC: 3.6 G/DL (ref 3.5–5.2)
ALBUMIN/GLOB SERPL: 1.8 G/DL
ALP SERPL-CCNC: 67 U/L (ref 39–117)
ALT SERPL W P-5'-P-CCNC: 27 U/L (ref 1–41)
ANION GAP SERPL CALCULATED.3IONS-SCNC: 13 MMOL/L (ref 5–15)
ANION GAP SERPL CALCULATED.3IONS-SCNC: 17 MMOL/L (ref 5–15)
ANION GAP SERPL CALCULATED.3IONS-SCNC: 17 MMOL/L (ref 5–15)
APTT PPP: 63.3 SECONDS (ref 22–39)
ARTERIAL PATENCY WRIST A: ABNORMAL
AST SERPL-CCNC: 46 U/L (ref 1–40)
ATMOSPHERIC PRESS: ABNORMAL MM[HG]
BASE EXCESS BLDA CALC-SCNC: -2.9 MMOL/L (ref 0–2)
BASOPHILS # BLD AUTO: 0.01 10*3/MM3 (ref 0–0.2)
BASOPHILS NFR BLD AUTO: 0.1 % (ref 0–1.5)
BDY SITE: ABNORMAL
BILIRUB SERPL-MCNC: 1.1 MG/DL (ref 0–1.2)
BODY TEMPERATURE: 37 C
BUN SERPL-MCNC: 13 MG/DL (ref 6–20)
BUN SERPL-MCNC: 13 MG/DL (ref 6–20)
BUN SERPL-MCNC: 14 MG/DL (ref 6–20)
BUN/CREAT SERPL: 13.4 (ref 7–25)
BUN/CREAT SERPL: 13.4 (ref 7–25)
BUN/CREAT SERPL: 16.1 (ref 7–25)
CA-I SERPL ISE-MCNC: 1.2 MMOL/L (ref 1.12–1.32)
CALCIUM SPEC-SCNC: 8.8 MG/DL (ref 8.6–10.5)
CALCIUM SPEC-SCNC: 8.8 MG/DL (ref 8.6–10.5)
CALCIUM SPEC-SCNC: 9.8 MG/DL (ref 8.6–10.5)
CHLORIDE SERPL-SCNC: 101 MMOL/L (ref 98–107)
CHLORIDE SERPL-SCNC: 104 MMOL/L (ref 98–107)
CHLORIDE SERPL-SCNC: 104 MMOL/L (ref 98–107)
CO2 BLDA-SCNC: 25.9 MMOL/L (ref 22–33)
CO2 SERPL-SCNC: 23 MMOL/L (ref 22–29)
CO2 SERPL-SCNC: 23 MMOL/L (ref 22–29)
CO2 SERPL-SCNC: 24 MMOL/L (ref 22–29)
COHGB MFR BLD: 1 % (ref 0–2)
CREAT SERPL-MCNC: 0.87 MG/DL (ref 0.76–1.27)
CREAT SERPL-MCNC: 0.97 MG/DL (ref 0.76–1.27)
CREAT SERPL-MCNC: 0.97 MG/DL (ref 0.76–1.27)
DEPRECATED RDW RBC AUTO: 40.3 FL (ref 37–54)
DEPRECATED RDW RBC AUTO: 42.1 FL (ref 37–54)
EGFRCR SERPLBLD CKD-EPI 2021: 108.4 ML/MIN/1.73
EGFRCR SERPLBLD CKD-EPI 2021: 98.1 ML/MIN/1.73
EGFRCR SERPLBLD CKD-EPI 2021: 98.1 ML/MIN/1.73
EOSINOPHIL # BLD AUTO: 0.02 10*3/MM3 (ref 0–0.4)
EOSINOPHIL NFR BLD AUTO: 0.2 % (ref 0.3–6.2)
EPAP: 0
ERYTHROCYTE [DISTWIDTH] IN BLOOD BY AUTOMATED COUNT: 13 % (ref 12.3–15.4)
ERYTHROCYTE [DISTWIDTH] IN BLOOD BY AUTOMATED COUNT: 13.3 % (ref 12.3–15.4)
GLOBULIN UR ELPH-MCNC: 2 GM/DL
GLUCOSE BLDC GLUCOMTR-MCNC: 105 MG/DL (ref 70–130)
GLUCOSE BLDC GLUCOMTR-MCNC: 108 MG/DL (ref 70–130)
GLUCOSE BLDC GLUCOMTR-MCNC: 116 MG/DL (ref 70–130)
GLUCOSE BLDC GLUCOMTR-MCNC: 132 MG/DL (ref 70–130)
GLUCOSE BLDC GLUCOMTR-MCNC: 156 MG/DL (ref 70–130)
GLUCOSE BLDC GLUCOMTR-MCNC: 164 MG/DL (ref 70–130)
GLUCOSE SERPL-MCNC: 115 MG/DL (ref 65–99)
GLUCOSE SERPL-MCNC: 125 MG/DL (ref 65–99)
GLUCOSE SERPL-MCNC: 125 MG/DL (ref 65–99)
HCO3 BLDA-SCNC: 24.3 MMOL/L (ref 20–26)
HCT VFR BLD AUTO: 44.3 % (ref 37.5–51)
HCT VFR BLD AUTO: 46.1 % (ref 37.5–51)
HCT VFR BLD CALC: 48.5 % (ref 38–51)
HGB BLD-MCNC: 14.5 G/DL (ref 13–17.7)
HGB BLD-MCNC: 15.6 G/DL (ref 13–17.7)
HGB BLDA-MCNC: 15.8 G/DL (ref 13.5–17.5)
IMM GRANULOCYTES # BLD AUTO: 0.05 10*3/MM3 (ref 0–0.05)
IMM GRANULOCYTES NFR BLD AUTO: 0.5 % (ref 0–0.5)
INHALED O2 CONCENTRATION: 100 %
INR PPP: 1.74 (ref 0.89–1.12)
IPAP: 0
LYMPHOCYTES # BLD AUTO: 3.1 10*3/MM3 (ref 0.7–3.1)
LYMPHOCYTES NFR BLD AUTO: 30.1 % (ref 19.6–45.3)
MAGNESIUM SERPL-MCNC: 1.6 MG/DL (ref 1.6–2.6)
MCH RBC QN AUTO: 28.7 PG (ref 26.6–33)
MCH RBC QN AUTO: 29.2 PG (ref 26.6–33)
MCHC RBC AUTO-ENTMCNC: 32.7 G/DL (ref 31.5–35.7)
MCHC RBC AUTO-ENTMCNC: 33.8 G/DL (ref 31.5–35.7)
MCV RBC AUTO: 86.3 FL (ref 79–97)
MCV RBC AUTO: 87.5 FL (ref 79–97)
METHGB BLD QL: 0.8 % (ref 0–1.5)
MODALITY: ABNORMAL
MONOCYTES # BLD AUTO: 0.26 10*3/MM3 (ref 0.1–0.9)
MONOCYTES NFR BLD AUTO: 2.5 % (ref 5–12)
NEUTROPHILS NFR BLD AUTO: 6.85 10*3/MM3 (ref 1.7–7)
NEUTROPHILS NFR BLD AUTO: 66.6 % (ref 42.7–76)
NOTE: ABNORMAL
NRBC BLD AUTO-RTO: 0 /100 WBC (ref 0–0.2)
OXYHGB MFR BLDV: 97.8 % (ref 94–99)
PA ADP PRP-ACNC: 129 PRU
PAW @ PEAK INSP FLOW SETTING VENT: 0 CMH2O
PCO2 BLDA: 50.3 MM HG (ref 35–45)
PCO2 TEMP ADJ BLD: 50.3 MM HG (ref 35–48)
PEEP RESPIRATORY: 5 CM[H2O]
PH BLDA: 7.29 PH UNITS (ref 7.35–7.45)
PH, TEMP CORRECTED: 7.29 PH UNITS
PHOSPHATE SERPL-MCNC: 6 MG/DL (ref 2.5–4.5)
PLATELET # BLD AUTO: 214 10*3/MM3 (ref 140–450)
PLATELET # BLD AUTO: 276 10*3/MM3 (ref 140–450)
PMV BLD AUTO: 9.3 FL (ref 6–12)
PMV BLD AUTO: 9.3 FL (ref 6–12)
PO2 BLDA: 182 MM HG (ref 83–108)
PO2 TEMP ADJ BLD: 182 MM HG (ref 83–108)
POTASSIUM SERPL-SCNC: 3.8 MMOL/L (ref 3.5–5.2)
POTASSIUM SERPL-SCNC: 3.8 MMOL/L (ref 3.5–5.2)
POTASSIUM SERPL-SCNC: 4.4 MMOL/L (ref 3.5–5.2)
PROT SERPL-MCNC: 5.6 G/DL (ref 6–8.5)
PROTHROMBIN TIME: 20.5 SECONDS (ref 12.2–14.5)
RBC # BLD AUTO: 5.06 10*6/MM3 (ref 4.14–5.8)
RBC # BLD AUTO: 5.34 10*6/MM3 (ref 4.14–5.8)
SODIUM SERPL-SCNC: 138 MMOL/L (ref 136–145)
SODIUM SERPL-SCNC: 144 MMOL/L (ref 136–145)
SODIUM SERPL-SCNC: 144 MMOL/L (ref 136–145)
TOTAL RATE: 0 BREATHS/MINUTE
WBC NRBC COR # BLD: 10.29 10*3/MM3 (ref 3.4–10.8)
WBC NRBC COR # BLD: 9.88 10*3/MM3 (ref 3.4–10.8)

## 2023-07-13 PROCEDURE — 94799 UNLISTED PULMONARY SVC/PX: CPT

## 2023-07-13 PROCEDURE — 85610 PROTHROMBIN TIME: CPT | Performed by: PHYSICIAN ASSISTANT

## 2023-07-13 PROCEDURE — 33508 ENDOSCOPIC VEIN HARVEST: CPT | Performed by: THORACIC SURGERY (CARDIOTHORACIC VASCULAR SURGERY)

## 2023-07-13 PROCEDURE — 82805 BLOOD GASES W/O2 SATURATION: CPT

## 2023-07-13 PROCEDURE — 85025 COMPLETE CBC W/AUTO DIFF WBC: CPT | Performed by: PHYSICIAN ASSISTANT

## 2023-07-13 PROCEDURE — 5A1221Z PERFORMANCE OF CARDIAC OUTPUT, CONTINUOUS: ICD-10-PCS | Performed by: THORACIC SURGERY (CARDIOTHORACIC VASCULAR SURGERY)

## 2023-07-13 PROCEDURE — 25010000002 HEPARIN (PORCINE) PER 1000 UNITS: Performed by: THORACIC SURGERY (CARDIOTHORACIC VASCULAR SURGERY)

## 2023-07-13 PROCEDURE — 3E080GC INTRODUCTION OF OTHER THERAPEUTIC SUBSTANCE INTO HEART, OPEN APPROACH: ICD-10-PCS | Performed by: THORACIC SURGERY (CARDIOTHORACIC VASCULAR SURGERY)

## 2023-07-13 PROCEDURE — 85730 THROMBOPLASTIN TIME PARTIAL: CPT | Performed by: PHYSICIAN ASSISTANT

## 2023-07-13 PROCEDURE — 25010000002 CEFAZOLIN PER 500 MG: Performed by: THORACIC SURGERY (CARDIOTHORACIC VASCULAR SURGERY)

## 2023-07-13 PROCEDURE — 86900 BLOOD TYPING SEROLOGIC ABO: CPT

## 2023-07-13 PROCEDURE — 25010000002 PROPOFOL 10 MG/ML EMULSION: Performed by: THORACIC SURGERY (CARDIOTHORACIC VASCULAR SURGERY)

## 2023-07-13 PROCEDURE — 82330 ASSAY OF CALCIUM: CPT

## 2023-07-13 PROCEDURE — P9100 PATHOGEN TEST FOR PLATELETS: HCPCS

## 2023-07-13 PROCEDURE — 85027 COMPLETE CBC AUTOMATED: CPT | Performed by: PHYSICIAN ASSISTANT

## 2023-07-13 PROCEDURE — 85576 BLOOD PLATELET AGGREGATION: CPT | Performed by: STUDENT IN AN ORGANIZED HEALTH CARE EDUCATION/TRAINING PROGRAM

## 2023-07-13 PROCEDURE — C1894 INTRO/SHEATH, NON-LASER: HCPCS | Performed by: THORACIC SURGERY (CARDIOTHORACIC VASCULAR SURGERY)

## 2023-07-13 PROCEDURE — 02100Z9 BYPASS CORONARY ARTERY, ONE ARTERY FROM LEFT INTERNAL MAMMARY, OPEN APPROACH: ICD-10-PCS | Performed by: THORACIC SURGERY (CARDIOTHORACIC VASCULAR SURGERY)

## 2023-07-13 PROCEDURE — P9016 RBC LEUKOCYTES REDUCED: HCPCS

## 2023-07-13 PROCEDURE — 33519 CABG ARTERY-VEIN THREE: CPT | Performed by: THORACIC SURGERY (CARDIOTHORACIC VASCULAR SURGERY)

## 2023-07-13 PROCEDURE — 33967 INSERT I-AORT PERCUT DEVICE: CPT | Performed by: PHYSICIAN ASSISTANT

## 2023-07-13 PROCEDURE — P9041 ALBUMIN (HUMAN),5%, 50ML: HCPCS | Performed by: PHYSICIAN ASSISTANT

## 2023-07-13 PROCEDURE — 06BP4ZZ EXCISION OF RIGHT SAPHENOUS VEIN, PERCUTANEOUS ENDOSCOPIC APPROACH: ICD-10-PCS | Performed by: THORACIC SURGERY (CARDIOTHORACIC VASCULAR SURGERY)

## 2023-07-13 PROCEDURE — 0 MAGNESIUM SULFATE 4 GM/100ML SOLUTION: Performed by: THORACIC SURGERY (CARDIOTHORACIC VASCULAR SURGERY)

## 2023-07-13 PROCEDURE — 25810000003 DEXTROSE 5 % WITH KCL 20 MEQ 20 MEQ/L SOLUTION: Performed by: PHYSICIAN ASSISTANT

## 2023-07-13 PROCEDURE — 33533 CABG ARTERIAL SINGLE: CPT | Performed by: THORACIC SURGERY (CARDIOTHORACIC VASCULAR SURGERY)

## 2023-07-13 PROCEDURE — 82330 ASSAY OF CALCIUM: CPT | Performed by: PHYSICIAN ASSISTANT

## 2023-07-13 PROCEDURE — 33519 CABG ARTERY-VEIN THREE: CPT | Performed by: PHYSICIAN ASSISTANT

## 2023-07-13 PROCEDURE — 33967 INSERT I-AORT PERCUT DEVICE: CPT | Performed by: THORACIC SURGERY (CARDIOTHORACIC VASCULAR SURGERY)

## 2023-07-13 PROCEDURE — 71045 X-RAY EXAM CHEST 1 VIEW: CPT

## 2023-07-13 PROCEDURE — 25010000002 PROTAMINE SULFATE PER 10 MG: Performed by: PHYSICIAN ASSISTANT

## 2023-07-13 PROCEDURE — 85014 HEMATOCRIT: CPT

## 2023-07-13 PROCEDURE — 84132 ASSAY OF SERUM POTASSIUM: CPT

## 2023-07-13 PROCEDURE — 83050 HGB METHEMOGLOBIN QUAN: CPT

## 2023-07-13 PROCEDURE — 93010 ELECTROCARDIOGRAM REPORT: CPT | Performed by: INTERNAL MEDICINE

## 2023-07-13 PROCEDURE — 25010000002 VANCOMYCIN 1 G RECONSTITUTED SOLUTION 1 EACH VIAL: Performed by: THORACIC SURGERY (CARDIOTHORACIC VASCULAR SURGERY)

## 2023-07-13 PROCEDURE — 25010000002 PAPAVERINE PER 60 MG: Performed by: THORACIC SURGERY (CARDIOTHORACIC VASCULAR SURGERY)

## 2023-07-13 PROCEDURE — 25010000002 ACETAMINOPHEN 10 MG/ML SOLUTION: Performed by: PHYSICIAN ASSISTANT

## 2023-07-13 PROCEDURE — 5A2204Z RESTORATION OF CARDIAC RHYTHM, SINGLE: ICD-10-PCS | Performed by: THORACIC SURGERY (CARDIOTHORACIC VASCULAR SURGERY)

## 2023-07-13 PROCEDURE — 85027 COMPLETE CBC AUTOMATED: CPT | Performed by: STUDENT IN AN ORGANIZED HEALTH CARE EDUCATION/TRAINING PROGRAM

## 2023-07-13 PROCEDURE — 33508 ENDOSCOPIC VEIN HARVEST: CPT | Performed by: PHYSICIAN ASSISTANT

## 2023-07-13 PROCEDURE — 82375 ASSAY CARBOXYHB QUANT: CPT

## 2023-07-13 PROCEDURE — 99024 POSTOP FOLLOW-UP VISIT: CPT | Performed by: THORACIC SURGERY (CARDIOTHORACIC VASCULAR SURGERY)

## 2023-07-13 PROCEDURE — 80053 COMPREHEN METABOLIC PANEL: CPT | Performed by: PHYSICIAN ASSISTANT

## 2023-07-13 PROCEDURE — 021209W BYPASS CORONARY ARTERY, THREE ARTERIES FROM AORTA WITH AUTOLOGOUS VENOUS TISSUE, OPEN APPROACH: ICD-10-PCS | Performed by: THORACIC SURGERY (CARDIOTHORACIC VASCULAR SURGERY)

## 2023-07-13 PROCEDURE — 84100 ASSAY OF PHOSPHORUS: CPT | Performed by: PHYSICIAN ASSISTANT

## 2023-07-13 PROCEDURE — 85347 COAGULATION TIME ACTIVATED: CPT

## 2023-07-13 PROCEDURE — 83735 ASSAY OF MAGNESIUM: CPT | Performed by: PHYSICIAN ASSISTANT

## 2023-07-13 PROCEDURE — 36430 TRANSFUSION BLD/BLD COMPNT: CPT

## 2023-07-13 PROCEDURE — 25010000002 GENTAMICIN PER 80 MG: Performed by: THORACIC SURGERY (CARDIOTHORACIC VASCULAR SURGERY)

## 2023-07-13 PROCEDURE — 82947 ASSAY GLUCOSE BLOOD QUANT: CPT

## 2023-07-13 PROCEDURE — 82803 BLOOD GASES ANY COMBINATION: CPT

## 2023-07-13 PROCEDURE — 5A02210 ASSISTANCE WITH CARDIAC OUTPUT USING BALLOON PUMP, CONTINUOUS: ICD-10-PCS | Performed by: THORACIC SURGERY (CARDIOTHORACIC VASCULAR SURGERY)

## 2023-07-13 PROCEDURE — 25010000002 VANCOMYCIN PER 500 MG: Performed by: THORACIC SURGERY (CARDIOTHORACIC VASCULAR SURGERY)

## 2023-07-13 PROCEDURE — A4648 IMPLANTABLE TISSUE MARKER: HCPCS | Performed by: THORACIC SURGERY (CARDIOTHORACIC VASCULAR SURGERY)

## 2023-07-13 PROCEDURE — 84295 ASSAY OF SERUM SODIUM: CPT

## 2023-07-13 PROCEDURE — 93005 ELECTROCARDIOGRAM TRACING: CPT | Performed by: PHYSICIAN ASSISTANT

## 2023-07-13 PROCEDURE — 25010000002 ALBUMIN HUMAN 5% PER 50 ML: Performed by: PHYSICIAN ASSISTANT

## 2023-07-13 PROCEDURE — 94002 VENT MGMT INPAT INIT DAY: CPT

## 2023-07-13 PROCEDURE — 25010000002 FENTANYL CITRATE (PF) 50 MCG/ML SOLUTION: Performed by: THORACIC SURGERY (CARDIOTHORACIC VASCULAR SURGERY)

## 2023-07-13 PROCEDURE — P9035 PLATELET PHERES LEUKOREDUCED: HCPCS

## 2023-07-13 PROCEDURE — 0 POTASSIUM CHLORIDE PER 2 MEQ: Performed by: THORACIC SURGERY (CARDIOTHORACIC VASCULAR SURGERY)

## 2023-07-13 PROCEDURE — 33533 CABG ARTERIAL SINGLE: CPT | Performed by: PHYSICIAN ASSISTANT

## 2023-07-13 PROCEDURE — 99233 SBSQ HOSP IP/OBS HIGH 50: CPT | Performed by: INTERNAL MEDICINE

## 2023-07-13 DEVICE — DISK-SHAPED STYLE, SILICONE (1 PER STERILE PKG)
Type: IMPLANTABLE DEVICE | Site: HEART | Status: FUNCTIONAL
Brand: SCANLAN® RADIOMARK® GRAFT MARKERS

## 2023-07-13 RX ORDER — METOPROLOL TARTRATE 5 MG/5ML
2.5 INJECTION INTRAVENOUS EVERY 6 HOURS SCHEDULED
Status: DISCONTINUED | OUTPATIENT
Start: 2023-07-13 | End: 2023-07-14

## 2023-07-13 RX ORDER — NALOXONE HYDROCHLORIDE 0.4 MG/ML
0.2 INJECTION, SOLUTION INTRAMUSCULAR; INTRAVENOUS; SUBCUTANEOUS AS NEEDED
Status: DISCONTINUED | OUTPATIENT
Start: 2023-07-13 | End: 2023-07-18 | Stop reason: HOSPADM

## 2023-07-13 RX ORDER — LIDOCAINE HYDROCHLORIDE 10 MG/ML
0.5 INJECTION, SOLUTION EPIDURAL; INFILTRATION; INTRACAUDAL; PERINEURAL ONCE AS NEEDED
Status: COMPLETED | OUTPATIENT
Start: 2023-07-13 | End: 2023-07-13

## 2023-07-13 RX ORDER — CHLORHEXIDINE GLUCONATE 0.12 MG/ML
15 RINSE ORAL EVERY 12 HOURS SCHEDULED
Status: DISCONTINUED | OUTPATIENT
Start: 2023-07-13 | End: 2023-07-14

## 2023-07-13 RX ORDER — MIDAZOLAM HYDROCHLORIDE 1 MG/ML
1 INJECTION INTRAMUSCULAR; INTRAVENOUS
Status: COMPLETED | OUTPATIENT
Start: 2023-07-13 | End: 2023-07-13

## 2023-07-13 RX ORDER — ASPIRIN 325 MG
325 TABLET, DELAYED RELEASE (ENTERIC COATED) ORAL DAILY
Status: DISCONTINUED | OUTPATIENT
Start: 2023-07-14 | End: 2023-07-14

## 2023-07-13 RX ORDER — OXYCODONE HYDROCHLORIDE 5 MG/1
5 TABLET ORAL EVERY 4 HOURS PRN
Status: DISCONTINUED | OUTPATIENT
Start: 2023-07-13 | End: 2023-07-14

## 2023-07-13 RX ORDER — SODIUM CHLORIDE 9 MG/ML
INJECTION, SOLUTION INTRAVENOUS AS NEEDED
Status: DISCONTINUED | OUTPATIENT
Start: 2023-07-13 | End: 2023-07-13 | Stop reason: HOSPADM

## 2023-07-13 RX ORDER — DOBUTAMINE HYDROCHLORIDE 100 MG/100ML
2-20 INJECTION INTRAVENOUS CONTINUOUS PRN
Status: DISCONTINUED | OUTPATIENT
Start: 2023-07-13 | End: 2023-07-14

## 2023-07-13 RX ORDER — DOPAMINE HYDROCHLORIDE 160 MG/100ML
2-20 INJECTION, SOLUTION INTRAVENOUS CONTINUOUS PRN
Status: DISCONTINUED | OUTPATIENT
Start: 2023-07-13 | End: 2023-07-14

## 2023-07-13 RX ORDER — POTASSIUM CHLORIDE 29.8 MG/ML
20 INJECTION INTRAVENOUS ONCE
Status: COMPLETED | OUTPATIENT
Start: 2023-07-13 | End: 2023-07-13

## 2023-07-13 RX ORDER — AMOXICILLIN 250 MG
2 CAPSULE ORAL 2 TIMES DAILY
Status: DISCONTINUED | OUTPATIENT
Start: 2023-07-13 | End: 2023-07-18 | Stop reason: HOSPADM

## 2023-07-13 RX ORDER — MORPHINE SULFATE 2 MG/ML
2 INJECTION, SOLUTION INTRAMUSCULAR; INTRAVENOUS
Status: DISCONTINUED | OUTPATIENT
Start: 2023-07-13 | End: 2023-07-16

## 2023-07-13 RX ORDER — ONDANSETRON 2 MG/ML
4 INJECTION INTRAMUSCULAR; INTRAVENOUS EVERY 6 HOURS PRN
Status: DISCONTINUED | OUTPATIENT
Start: 2023-07-13 | End: 2023-07-17

## 2023-07-13 RX ORDER — NICOTINE POLACRILEX 4 MG
15 LOZENGE BUCCAL
Status: DISCONTINUED | OUTPATIENT
Start: 2023-07-13 | End: 2023-07-15

## 2023-07-13 RX ORDER — ASPIRIN 325 MG
325 TABLET ORAL ONCE
Status: COMPLETED | OUTPATIENT
Start: 2023-07-13 | End: 2023-07-13

## 2023-07-13 RX ORDER — PROTAMINE SULFATE 10 MG/ML
50 INJECTION, SOLUTION INTRAVENOUS ONCE
Status: COMPLETED | OUTPATIENT
Start: 2023-07-13 | End: 2023-07-13

## 2023-07-13 RX ORDER — MAGNESIUM SULFATE HEPTAHYDRATE 40 MG/ML
4 INJECTION, SOLUTION INTRAVENOUS ONCE
Status: COMPLETED | OUTPATIENT
Start: 2023-07-13 | End: 2023-07-14

## 2023-07-13 RX ORDER — NOREPINEPHRINE BITARTRATE 0.03 MG/ML
.02-.3 INJECTION, SOLUTION INTRAVENOUS CONTINUOUS PRN
Status: DISCONTINUED | OUTPATIENT
Start: 2023-07-13 | End: 2023-07-16

## 2023-07-13 RX ORDER — ALBUTEROL SULFATE 2.5 MG/3ML
2.5 SOLUTION RESPIRATORY (INHALATION) EVERY 4 HOURS PRN
Status: ACTIVE | OUTPATIENT
Start: 2023-07-13 | End: 2023-07-14

## 2023-07-13 RX ORDER — SODIUM CHLORIDE, SODIUM LACTATE, POTASSIUM CHLORIDE, CALCIUM CHLORIDE 600; 310; 30; 20 MG/100ML; MG/100ML; MG/100ML; MG/100ML
9 INJECTION, SOLUTION INTRAVENOUS CONTINUOUS
Status: DISCONTINUED | OUTPATIENT
Start: 2023-07-13 | End: 2023-07-13

## 2023-07-13 RX ORDER — SODIUM CHLORIDE 9 MG/ML
40 INJECTION, SOLUTION INTRAVENOUS AS NEEDED
Status: DISCONTINUED | OUTPATIENT
Start: 2023-07-13 | End: 2023-07-13 | Stop reason: HOSPADM

## 2023-07-13 RX ORDER — ACETAMINOPHEN 500 MG
1000 TABLET ORAL EVERY 8 HOURS SCHEDULED
Status: DISCONTINUED | OUTPATIENT
Start: 2023-07-14 | End: 2023-07-18 | Stop reason: HOSPADM

## 2023-07-13 RX ORDER — SODIUM CHLORIDE 0.9 % (FLUSH) 0.9 %
10 SYRINGE (ML) INJECTION AS NEEDED
Status: DISCONTINUED | OUTPATIENT
Start: 2023-07-13 | End: 2023-07-13 | Stop reason: HOSPADM

## 2023-07-13 RX ORDER — DEXTROSE MONOHYDRATE 25 G/50ML
10-50 INJECTION, SOLUTION INTRAVENOUS
Status: DISCONTINUED | OUTPATIENT
Start: 2023-07-13 | End: 2023-07-15

## 2023-07-13 RX ORDER — ATORVASTATIN CALCIUM 40 MG/1
40 TABLET, FILM COATED ORAL NIGHTLY
Status: DISCONTINUED | OUTPATIENT
Start: 2023-07-13 | End: 2023-07-18 | Stop reason: HOSPADM

## 2023-07-13 RX ORDER — CEFAZOLIN SODIUM 2 G/100ML
2 INJECTION, SOLUTION INTRAVENOUS EVERY 8 HOURS
Status: COMPLETED | OUTPATIENT
Start: 2023-07-13 | End: 2023-07-15

## 2023-07-13 RX ORDER — POTASSIUM CHLORIDE, DEXTROSE MONOHYDRATE 150; 5 MG/100ML; G/100ML
30 INJECTION, SOLUTION INTRAVENOUS CONTINUOUS
Status: DISCONTINUED | OUTPATIENT
Start: 2023-07-13 | End: 2023-07-17

## 2023-07-13 RX ORDER — FAMOTIDINE 20 MG/1
20 TABLET, FILM COATED ORAL ONCE
Status: COMPLETED | OUTPATIENT
Start: 2023-07-13 | End: 2023-07-13

## 2023-07-13 RX ORDER — NITROGLYCERIN 0.4 MG/1
0.4 TABLET SUBLINGUAL
Status: DISCONTINUED | OUTPATIENT
Start: 2023-07-13 | End: 2023-07-18 | Stop reason: HOSPADM

## 2023-07-13 RX ORDER — IBUPROFEN 600 MG/1
1 TABLET ORAL
Status: DISCONTINUED | OUTPATIENT
Start: 2023-07-13 | End: 2023-07-15

## 2023-07-13 RX ORDER — VANCOMYCIN HYDROCHLORIDE 500 MG/10ML
INJECTION, POWDER, LYOPHILIZED, FOR SOLUTION INTRAVENOUS AS NEEDED
Status: DISCONTINUED | OUTPATIENT
Start: 2023-07-13 | End: 2023-07-13 | Stop reason: HOSPADM

## 2023-07-13 RX ORDER — THROMBIN HUMAN AND FIBRINOGEN 2; 5.5 [USP'U]/1; MG/1
PATCH TOPICAL AS NEEDED
Status: DISCONTINUED | OUTPATIENT
Start: 2023-07-13 | End: 2023-07-13 | Stop reason: HOSPADM

## 2023-07-13 RX ORDER — NITROGLYCERIN 20 MG/100ML
5-200 INJECTION INTRAVENOUS CONTINUOUS PRN
Status: DISCONTINUED | OUTPATIENT
Start: 2023-07-13 | End: 2023-07-18 | Stop reason: HOSPADM

## 2023-07-13 RX ORDER — GABAPENTIN 100 MG/1
100 CAPSULE ORAL 3 TIMES DAILY
Status: DISCONTINUED | OUTPATIENT
Start: 2023-07-13 | End: 2023-07-18 | Stop reason: HOSPADM

## 2023-07-13 RX ORDER — CEFAZOLIN SODIUM 2 G/100ML
2000 INJECTION, SOLUTION INTRAVENOUS ONCE
Status: COMPLETED | OUTPATIENT
Start: 2023-07-13 | End: 2023-07-13

## 2023-07-13 RX ORDER — MEPERIDINE HYDROCHLORIDE 25 MG/ML
25 INJECTION INTRAMUSCULAR; INTRAVENOUS; SUBCUTANEOUS EVERY 4 HOURS PRN
Status: DISCONTINUED | OUTPATIENT
Start: 2023-07-13 | End: 2023-07-14

## 2023-07-13 RX ORDER — DEXMEDETOMIDINE HYDROCHLORIDE 4 UG/ML
.2-1.5 INJECTION, SOLUTION INTRAVENOUS CONTINUOUS PRN
Status: DISCONTINUED | OUTPATIENT
Start: 2023-07-13 | End: 2023-07-14

## 2023-07-13 RX ORDER — FENTANYL CITRATE 50 UG/ML
25 INJECTION, SOLUTION INTRAMUSCULAR; INTRAVENOUS
Status: DISCONTINUED | OUTPATIENT
Start: 2023-07-13 | End: 2023-07-14

## 2023-07-13 RX ORDER — PAPAVERINE HYDROCHLORIDE 30 MG/ML
INJECTION INTRAMUSCULAR; INTRAVENOUS AS NEEDED
Status: DISCONTINUED | OUTPATIENT
Start: 2023-07-13 | End: 2023-07-13 | Stop reason: HOSPADM

## 2023-07-13 RX ORDER — ALBUMIN, HUMAN INJ 5% 5 %
250 SOLUTION INTRAVENOUS AS NEEDED
Status: DISCONTINUED | OUTPATIENT
Start: 2023-07-13 | End: 2023-07-18 | Stop reason: HOSPADM

## 2023-07-13 RX ORDER — ACETAMINOPHEN 10 MG/ML
1000 INJECTION, SOLUTION INTRAVENOUS ONCE
Status: COMPLETED | OUTPATIENT
Start: 2023-07-13 | End: 2023-07-13

## 2023-07-13 RX ADMIN — CHLORHEXIDINE GLUCONATE 15 ML: 1.2 SOLUTION ORAL at 09:32

## 2023-07-13 RX ADMIN — POTASSIUM CHLORIDE AND DEXTROSE MONOHYDRATE 30 ML/HR: 150; 5 INJECTION, SOLUTION INTRAVENOUS at 19:21

## 2023-07-13 RX ADMIN — MUPIROCIN 1 APPLICATION: 20 OINTMENT TOPICAL at 09:32

## 2023-07-13 RX ADMIN — ASPIRIN 81 MG: 81 TABLET, COATED ORAL at 09:32

## 2023-07-13 RX ADMIN — CHLORHEXIDINE GLUCONATE 15 ML: 1.2 SOLUTION ORAL at 21:13

## 2023-07-13 RX ADMIN — ALBUMIN (HUMAN) 250 ML: 12.5 INJECTION, SOLUTION INTRAVENOUS at 19:19

## 2023-07-13 RX ADMIN — FENTANYL CITRATE 25 MCG: 50 INJECTION, SOLUTION INTRAMUSCULAR; INTRAVENOUS at 23:35

## 2023-07-13 RX ADMIN — FAMOTIDINE 20 MG: 20 TABLET, FILM COATED ORAL at 10:18

## 2023-07-13 RX ADMIN — SODIUM CHLORIDE, POTASSIUM CHLORIDE, SODIUM LACTATE AND CALCIUM CHLORIDE 9 ML/HR: 600; 310; 30; 20 INJECTION, SOLUTION INTRAVENOUS at 10:18

## 2023-07-13 RX ADMIN — MAGNESIUM SULFATE HEPTAHYDRATE 4 G: 40 INJECTION, SOLUTION INTRAVENOUS at 21:28

## 2023-07-13 RX ADMIN — ATORVASTATIN CALCIUM 40 MG: 40 TABLET, FILM COATED ORAL at 21:14

## 2023-07-13 RX ADMIN — SENNOSIDES AND DOCUSATE SODIUM 2 TABLET: 50; 8.6 TABLET ORAL at 21:14

## 2023-07-13 RX ADMIN — Medication 2 G: at 21:13

## 2023-07-13 RX ADMIN — Medication 12.5 MG: at 09:32

## 2023-07-13 RX ADMIN — POTASSIUM CHLORIDE 20 MEQ: 29.8 INJECTION, SOLUTION INTRAVENOUS at 22:05

## 2023-07-13 RX ADMIN — ACETAMINOPHEN 1000 MG: 10 INJECTION INTRAVENOUS at 21:13

## 2023-07-13 RX ADMIN — PROTAMINE SULFATE 50 MG: 10 INJECTION, SOLUTION INTRAVENOUS at 19:20

## 2023-07-13 RX ADMIN — PROPOFOL 50 MCG/KG/MIN: 10 INJECTION, EMULSION INTRAVENOUS at 20:34

## 2023-07-13 RX ADMIN — ASPIRIN 325 MG: 325 TABLET ORAL at 21:14

## 2023-07-13 RX ADMIN — LIDOCAINE HYDROCHLORIDE 0.5 ML: 10 INJECTION, SOLUTION EPIDURAL; INFILTRATION; INTRACAUDAL; PERINEURAL at 10:18

## 2023-07-13 RX ADMIN — GABAPENTIN 100 MG: 100 CAPSULE ORAL at 21:14

## 2023-07-13 RX ADMIN — INSULIN HUMAN 2.1 UNITS/HR: 1 INJECTION, SOLUTION INTRAVENOUS at 21:48

## 2023-07-13 NOTE — OP NOTE
Operative Report    Preop Diagnosis: Hypertension hyperlipidemia FEV1 approximately 65%.  Multivessel coronary disease    Results of STS risk score discussed with patient and family    Postoperative Diagnosis: Same      Procedure: Coronary artery bypass grafting x4 endoscopic harvesting the right great saphenous vein.  And insertion of an intra-aortic balloon pump.  Left internal mammary artery to left anterior descending.  Saphenous vein graft to the obtuse marginal branch and circumflex saphenous vein graft to the posterior sending of the right saphenous vein graft to the diagonal branch of the LAD.        Surgeons: Florencio Hernandez MD      Assistant: Pilar Rios PA-C    The Assistant provided services of suctioning, irrigation and retraction.  Also, assisted in suture closure of parts of the skin incision.      Indication: This patient was transferred to our facility from an outside facility with severe coronary artery disease.  This young age this patient had an occluded left anterior descending coronary and occluded right coronary artery and significant disease in the circumflex and diagonal branch of the LAD.  He understood that surgery carries with the risk of stroke bleeding infection and death and we agreed to proceed with explained the STS risk database predicated I thought the risk was somewhat higher secondary to poor target vessels.  He agreed to proceed        Description: Supine position sterile prep and drape antibiotics given general endotracheal anesthesia.  Right great saphenous vein was harvested endoscopically and median sternotomy performed the left internal mammary artery was taken from the chest wall as a pedicle graft there was a very good quality conduit.  Heparin was given and cannulas placed in the ascending aortic aneurysm atrial appendage and patient begun on cardiopulmonary bypass.  Aorta was crossclamped and antegrade cardioplegia was given for saphenous vein graft was sutured to  the obtuse marginal branch circumflex just prior to to bifurcating branches.  These vessels had disease distally throughout and were ungraftable on their own individually but by grafting just prior to their branch bypass the major proximal blockage.  Second saphenous vein graft was sutured to the diagonal branch of the LAD which was a thick walled heavily calcified vessel with a lumen of only 1 to 1-1/2 mm.  The third saphenous vein graft was sutured to the posterior descending branch of the right which similarly was a small 1-1/2 mm vessel.  A 2 mm probe would not pass in any of these 3 vessels.  The left anterior descending coronary was chronically occluded but distally near the apex of the heart I found a soft area but again it was only 1-1/2 mm in diameter and the left internal mammary was sutured in this area.  The 3 proximal vein graft sutured to the ascending aorta and the patient was initially weaned from bypass however after the protamine was given and the chest retractor out the patient's blood pressure started to precipitously drop without any evidence of ischemia.  We rebolused with heparin and had to place rapid aortic and right atrial cannula and return to cardiopulmonary bypass and left a heart rest which time I placed an intra-aortic balloon pump via the left femoral artery.  At this time we were able to come off bypass without any pressor agents and in a sinus rhythm.  I had a good quality Doppler signals in all 3 vein grafts and the left internal mammary artery.  We gave all of the protamine before removing any the cannulas and the patient maintained stability.  The cannulas were then removed the chest was irrigated copiously with an antibiotic solution chest tubes were placed the sternum was closed with wire the fashion skin was suture the sponge and needle count reported as correct.  Patient received 1 unit of packed red blood cells.  The estimated acute blood loss was probably 400 mL.  The only  obvious complication was a requirement of needing an intra-aortic balloon pump on this patient.  His target vessels were extremely poor for his young age reoperation any of these patients arteries in the future might be somewhat treacherous      Please note that portions of this note were completed with a voice recognition program. Efforts were made to edit the dictations, but occasionally words are mistranscribed.

## 2023-07-13 NOTE — PROGRESS NOTES
CTS Progress Note       LOS: 2 days   Patient Care Team:  Rigoberto Solano DO as PCP - General (Family Medicine)    Chief Complaint: CAD (coronary artery disease)    Vital Signs:  Temp:  [97.5 °F (36.4 °C)-98.3 °F (36.8 °C)] 97.9 °F (36.6 °C)  Heart Rate:  [62-99] 63  Resp:  [16-18] 16  BP: (121-138)/(79-98) 121/79    Physical Exam: No distress reported       Results:     Results from last 7 days   Lab Units 07/13/23  0435   WBC 10*3/mm3 9.88   HEMOGLOBIN g/dL 15.6   HEMATOCRIT % 46.1   PLATELETS 10*3/mm3 276     Results from last 7 days   Lab Units 07/13/23  0435   SODIUM mmol/L 138   POTASSIUM mmol/L 4.4   CHLORIDE mmol/L 101   CO2 mmol/L 24.0   BUN mg/dL 14   CREATININE mg/dL 0.87   GLUCOSE mg/dL 115*   CALCIUM mg/dL 9.8           Imaging Results (Last 24 Hours)       Procedure Component Value Units Date/Time    XR Chest 1 View [[id number]] Collected: 07/12/23 0746     Updated: 07/12/23 0750    Narrative:      XR CHEST 1 VW    Date of Exam: 7/12/2023 3:46 AM EDT    Indication: PRE-CABG    Comparison: None available.    Findings:  Lungs are normally expanded. Mild cardiomegaly. No significant pneumothorax, pleural effusion or focal pulmonary parenchymal opacity. Bones and soft tissues are within normal limits.    Impression:      No acute cardiopulmonary abnormality.          Electronically Signed: Bela Sterling    7/12/2023 7:47 AM EDT    Workstation ID: [id number]            Assessment      CAD (coronary artery disease)    Coronary artery disease involving native heart with angina pectoris        Plan   For coronary surgery later today    Please note that portions of this note were completed with a voice recognition program. Efforts were made to edit the dictations, but occasionally words are mistranscribed.    Florencio Hernandez MD  07/13/23  06:41 EDT

## 2023-07-13 NOTE — PROGRESS NOTES
Intensive Care Follow-up      LOS: 2 days     Mr. Rm Noel, 43 y.o. male is followed for: Glycemic, Electrolyte, Respiratory, and Medical management     Subjective - Interval History     43-year-old male admitted to the intensive care unit on 7/13/2023 after having undergone a CABG x4 with intra-aortic balloon pump placement.    Patient was moved to the intensive care unit on multiple continuous infusions and interactive balloon pump at one-to-one ratio.    History of tobacco abuse up till this illness.  Carries a diagnosis of COPD although preoperative spirometry failed to exhibit airway obstruction.    The patient's relevant past medical, surgical and social history were reviewed and updated in Epic as appropriate.     Objective     Infusions:  dexmedetomidine, 0.2-1.5 mcg/kg/hr  dextrose 5 % with KCl 20 mEq, 30 mL/hr, Last Rate: 30 mL/hr (07/13/23 1921)  DOBUTamine, 2-20 mcg/kg/min  DOPamine, 2-20 mcg/kg/min  EPINEPHrine, 0.02-0.3 mcg/kg/min  EPINEPHrine, 0.02-0.3 mcg/kg/min  insulin, 0-100 Units/hr  niCARdipine, 5-15 mg/hr  nitroglycerin, 5-200 mcg/min  norepinephrine, 0.02-0.3 mcg/kg/min, Last Rate: 0.08 mcg/kg/min (07/13/23 1836)  phenylephrine, 0.5-3 mcg/kg/min  propofol, 5-50 mcg/kg/min, Last Rate: 50 mcg/kg/min (07/13/23 1836)      Medications:  acetaminophen, 1,000 mg, Intravenous, Once  [START ON 7/14/2023] acetaminophen, 1,000 mg, Oral, Q8H  [START ON 7/14/2023] aspirin, 325 mg, Oral, Daily  aspirin, 325 mg, Oral, Once  atorvastatin, 40 mg, Oral, Nightly  ceFAZolin, 2 g, Intravenous, Q8H  chlorhexidine, 15 mL, Mouth/Throat, Q12H  gabapentin, 100 mg, Oral, TID  [START ON 7/14/2023] metoprolol tartrate, 12.5 mg, Oral, Q12H  metoprolol tartrate, 2.5 mg, Intravenous, Q6H  pharmacy consult - [place], , Does not apply, Daily  senna-docusate sodium, 2 tablet, Oral, BID      Intake/Output         07/12/23 0700 - 07/13/23 0659 07/13/23 0700 - 07/14/23 0659    Intake (ml) 440 2117    Output (ml) 3 2300    Net  (ml) 437 -183    Last Weight 95.7 kg (211 lb) 95.7 kg (210 lb 15.7 oz)          Vital Sign Min/Max for last 24 hours  Temp  Min: 97.1 °F (36.2 °C)  Max: 97.9 °F (36.6 °C)   BP  Min: 121/79  Max: 145/89   Pulse  Min: 63  Max: 99   Resp  Min: 16  Max: 18   SpO2  Min: 95 %  Max: 98 %   No data recorded        Physical Exam:   GENERAL: Intubated, mechanically ventilated, no distress   HEENT: Introducer site okay.  Endotracheal tube at 22 cm.  Sclera nonicteric   LUNGS: No wheezes or rhonchi   HEART: Regular rate and rhythm.  Sternal incision without drainage or dehiscence.  MTs without air leak   GI: Soft, quiet   EXTREMITIES: No clubbing or cyanosis.  Groin site without hematoma or bleeding.  Palpable lower extremity pulses   NEURO/PSYCH: Sedated and unresponsive    Results from last 7 days   Lab Units 07/13/23  0435 07/12/23  0801   WBC 10*3/mm3 9.88 8.89   HEMOGLOBIN g/dL 15.6 14.7   PLATELETS 10*3/mm3 276 253     Results from last 7 days   Lab Units 07/13/23  0435 07/12/23  0801   SODIUM mmol/L 138 138   POTASSIUM mmol/L 4.4 4.3   CO2 mmol/L 24.0 22.0   BUN mg/dL 14 12   CREATININE mg/dL 0.87 0.80   GLUCOSE mg/dL 115* 167*     Estimated Creatinine Clearance: 122.4 mL/min (by C-G formula based on SCr of 0.87 mg/dL).    Results from last 7 days   Lab Units 07/12/23  0801   HEMOGLOBIN A1C % 5.80*           No results found for: LACTATE       Images: Post procedure chest x-ray reveals typical surgical lines and tubes with left basilar atelectasis and no pneumothorax    I reviewed the patient's results and images.     Impression      Active Hospital Problems    Diagnosis     **CAD (coronary artery disease) (S/P CABG x 4 w/ IABP 7/13/23)     Tobacco abuse     H/O COPD (No obstruction on pre-op venita)          Plan        Wean mechanical ventilatory support when hemodynamically stable and intra-aortic balloon pump out  Continue sedation and pain control through the night  Follow-up electrolytes and replace as  necessary  Insulin drip for 24 hours then transition  Inhaled bronchodilators as needed     I discussed the patient's findings and my recommendations with nursing staff      High level of risk due to:  drug(s) requiring intensive monitoring for toxicity. and 1 acute illness with threat to life or bodily function     CHON Noble MD  Pulmonary and Critical Care Medicine

## 2023-07-14 ENCOUNTER — APPOINTMENT (OUTPATIENT)
Dept: GENERAL RADIOLOGY | Facility: HOSPITAL | Age: 43
DRG: 236 | End: 2023-07-14
Payer: MEDICAID

## 2023-07-14 LAB
ACT BLD: 119 SECONDS (ref 82–152)
ACT BLD: 119 SECONDS (ref 82–152)
ACT BLD: 137 SECONDS (ref 82–152)
ACT BLD: 155 SECONDS (ref 82–152)
ACT BLD: 155 SECONDS (ref 82–152)
ACT BLD: 510 SECONDS (ref 82–152)
ACT BLD: 582 SECONDS (ref 82–152)
ACT BLD: 612 SECONDS (ref 82–152)
ACT BLD: 720 SECONDS (ref 82–152)
ACT BLD: >1000 SECONDS (ref 82–152)
ALBUMIN SERPL-MCNC: 3.7 G/DL (ref 3.5–5.2)
ANION GAP SERPL CALCULATED.3IONS-SCNC: 12 MMOL/L (ref 5–15)
ANION GAP SERPL CALCULATED.3IONS-SCNC: 13 MMOL/L (ref 5–15)
APTT PPP: 33.8 SECONDS (ref 22–39)
ARTERIAL PATENCY WRIST A: ABNORMAL
ATMOSPHERIC PRESS: ABNORMAL MM[HG]
BASE EXCESS BLDA CALC-SCNC: -1 MMOL/L (ref -5–5)
BASE EXCESS BLDA CALC-SCNC: -1 MMOL/L (ref -5–5)
BASE EXCESS BLDA CALC-SCNC: -2 MMOL/L (ref -5–5)
BASE EXCESS BLDA CALC-SCNC: -3 MMOL/L (ref -5–5)
BASE EXCESS BLDA CALC-SCNC: -3 MMOL/L (ref -5–5)
BASE EXCESS BLDA CALC-SCNC: -6 MMOL/L (ref -5–5)
BASE EXCESS BLDA CALC-SCNC: -8 MMOL/L (ref -5–5)
BASE EXCESS BLDA CALC-SCNC: -9 MMOL/L (ref -5–5)
BASE EXCESS BLDA CALC-SCNC: 0 MMOL/L (ref -5–5)
BASE EXCESS BLDA CALC-SCNC: 0.6 MMOL/L (ref 0–2)
BDY SITE: ABNORMAL
BH BB BLOOD EXPIRATION DATE: NORMAL
BH BB BLOOD TYPE BARCODE: 6200
BH BB BLOOD TYPE BARCODE: 7300
BH BB BLOOD TYPE BARCODE: 8400
BH BB BLOOD TYPE BARCODE: 9500
BH BB DISPENSE STATUS: NORMAL
BH BB PRODUCT CODE: NORMAL
BH BB UNIT NUMBER: NORMAL
BODY TEMPERATURE: 37 C
BUN SERPL-MCNC: 14 MG/DL (ref 6–20)
BUN SERPL-MCNC: 14 MG/DL (ref 6–20)
BUN/CREAT SERPL: 12.5 (ref 7–25)
BUN/CREAT SERPL: 13.5 (ref 7–25)
CA-I BLDA-SCNC: 0.96 MMOL/L (ref 1.2–1.32)
CA-I BLDA-SCNC: 1.01 MMOL/L (ref 1.2–1.32)
CA-I BLDA-SCNC: 1.04 MMOL/L (ref 1.2–1.32)
CA-I BLDA-SCNC: 1.06 MMOL/L (ref 1.2–1.32)
CA-I BLDA-SCNC: 1.19 MMOL/L (ref 1.2–1.32)
CA-I BLDA-SCNC: 1.19 MMOL/L (ref 1.2–1.32)
CA-I BLDA-SCNC: 1.2 MMOL/L (ref 1.2–1.32)
CA-I BLDA-SCNC: 1.21 MMOL/L (ref 1.2–1.32)
CA-I BLDA-SCNC: 1.21 MMOL/L (ref 1.2–1.32)
CA-I BLDA-SCNC: 1.26 MMOL/L (ref 1.2–1.32)
CA-I BLDA-SCNC: 1.58 MMOL/L (ref 1.2–1.32)
CALCIUM SPEC-SCNC: 8.4 MG/DL (ref 8.6–10.5)
CALCIUM SPEC-SCNC: 8.5 MG/DL (ref 8.6–10.5)
CHLORIDE SERPL-SCNC: 106 MMOL/L (ref 98–107)
CHLORIDE SERPL-SCNC: 107 MMOL/L (ref 98–107)
CO2 BLDA-SCNC: 21 MMOL/L (ref 24–29)
CO2 BLDA-SCNC: 22 MMOL/L (ref 24–29)
CO2 BLDA-SCNC: 23 MMOL/L (ref 24–29)
CO2 BLDA-SCNC: 25 MMOL/L (ref 24–29)
CO2 BLDA-SCNC: 26 MMOL/L (ref 24–29)
CO2 BLDA-SCNC: 26.2 MMOL/L (ref 22–33)
CO2 BLDA-SCNC: 27 MMOL/L (ref 24–29)
CO2 BLDA-SCNC: 27 MMOL/L (ref 24–29)
CO2 BLDA-SCNC: 28 MMOL/L (ref 24–29)
CO2 BLDA-SCNC: 29 MMOL/L (ref 24–29)
CO2 SERPL-SCNC: 23 MMOL/L (ref 22–29)
CO2 SERPL-SCNC: 24 MMOL/L (ref 22–29)
COHGB MFR BLD: 1.1 % (ref 0–2)
CPAP: 5 CMH2O
CREAT SERPL-MCNC: 1.04 MG/DL (ref 0.76–1.27)
CREAT SERPL-MCNC: 1.12 MG/DL (ref 0.76–1.27)
DEPRECATED RDW RBC AUTO: 42.3 FL (ref 37–54)
DEPRECATED RDW RBC AUTO: 43.3 FL (ref 37–54)
EGFRCR SERPLBLD CKD-EPI 2021: 82.6 ML/MIN/1.73
EGFRCR SERPLBLD CKD-EPI 2021: 90.2 ML/MIN/1.73
EPAP: 0
ERYTHROCYTE [DISTWIDTH] IN BLOOD BY AUTOMATED COUNT: 13.3 % (ref 12.3–15.4)
ERYTHROCYTE [DISTWIDTH] IN BLOOD BY AUTOMATED COUNT: 13.3 % (ref 12.3–15.4)
GLUCOSE BLDC GLUCOMTR-MCNC: 102 MG/DL (ref 70–130)
GLUCOSE BLDC GLUCOMTR-MCNC: 105 MG/DL (ref 70–130)
GLUCOSE BLDC GLUCOMTR-MCNC: 115 MG/DL (ref 70–130)
GLUCOSE BLDC GLUCOMTR-MCNC: 122 MG/DL (ref 70–130)
GLUCOSE BLDC GLUCOMTR-MCNC: 129 MG/DL (ref 70–130)
GLUCOSE BLDC GLUCOMTR-MCNC: 131 MG/DL (ref 70–130)
GLUCOSE BLDC GLUCOMTR-MCNC: 132 MG/DL (ref 70–130)
GLUCOSE BLDC GLUCOMTR-MCNC: 136 MG/DL (ref 70–130)
GLUCOSE BLDC GLUCOMTR-MCNC: 138 MG/DL (ref 70–130)
GLUCOSE BLDC GLUCOMTR-MCNC: 140 MG/DL (ref 70–130)
GLUCOSE BLDC GLUCOMTR-MCNC: 141 MG/DL (ref 70–130)
GLUCOSE BLDC GLUCOMTR-MCNC: 142 MG/DL (ref 70–130)
GLUCOSE BLDC GLUCOMTR-MCNC: 144 MG/DL (ref 70–130)
GLUCOSE BLDC GLUCOMTR-MCNC: 146 MG/DL (ref 70–130)
GLUCOSE BLDC GLUCOMTR-MCNC: 149 MG/DL (ref 70–130)
GLUCOSE BLDC GLUCOMTR-MCNC: 155 MG/DL (ref 70–130)
GLUCOSE BLDC GLUCOMTR-MCNC: 155 MG/DL (ref 70–130)
GLUCOSE BLDC GLUCOMTR-MCNC: 156 MG/DL (ref 70–130)
GLUCOSE BLDC GLUCOMTR-MCNC: 157 MG/DL (ref 70–130)
GLUCOSE BLDC GLUCOMTR-MCNC: 158 MG/DL (ref 70–130)
GLUCOSE BLDC GLUCOMTR-MCNC: 160 MG/DL (ref 70–130)
GLUCOSE BLDC GLUCOMTR-MCNC: 161 MG/DL (ref 70–130)
GLUCOSE BLDC GLUCOMTR-MCNC: 168 MG/DL (ref 70–130)
GLUCOSE BLDC GLUCOMTR-MCNC: 169 MG/DL (ref 70–130)
GLUCOSE BLDC GLUCOMTR-MCNC: 169 MG/DL (ref 70–130)
GLUCOSE BLDC GLUCOMTR-MCNC: 174 MG/DL (ref 70–130)
GLUCOSE BLDC GLUCOMTR-MCNC: 177 MG/DL (ref 70–130)
GLUCOSE BLDC GLUCOMTR-MCNC: 189 MG/DL (ref 70–130)
GLUCOSE SERPL-MCNC: 154 MG/DL (ref 65–99)
GLUCOSE SERPL-MCNC: 169 MG/DL (ref 65–99)
HCO3 BLDA-SCNC: 19.5 MMOL/L (ref 22–26)
HCO3 BLDA-SCNC: 20.3 MMOL/L (ref 22–26)
HCO3 BLDA-SCNC: 21.5 MMOL/L (ref 22–26)
HCO3 BLDA-SCNC: 23.2 MMOL/L (ref 22–26)
HCO3 BLDA-SCNC: 23.5 MMOL/L (ref 22–26)
HCO3 BLDA-SCNC: 23.7 MMOL/L (ref 22–26)
HCO3 BLDA-SCNC: 24.4 MMOL/L (ref 22–26)
HCO3 BLDA-SCNC: 25 MMOL/L (ref 20–26)
HCO3 BLDA-SCNC: 25.2 MMOL/L (ref 22–26)
HCO3 BLDA-SCNC: 25.9 MMOL/L (ref 22–26)
HCO3 BLDA-SCNC: 26.5 MMOL/L (ref 22–26)
HCO3 BLDA-SCNC: 26.9 MMOL/L (ref 22–26)
HCT VFR BLD AUTO: 37.9 % (ref 37.5–51)
HCT VFR BLD AUTO: 38.2 % (ref 37.5–51)
HCT VFR BLD CALC: 35.3 % (ref 38–51)
HCT VFR BLDA CALC: 26 % (ref 38–51)
HCT VFR BLDA CALC: 27 % (ref 38–51)
HCT VFR BLDA CALC: 28 % (ref 38–51)
HCT VFR BLDA CALC: 31 % (ref 38–51)
HCT VFR BLDA CALC: 33 % (ref 38–51)
HCT VFR BLDA CALC: 33 % (ref 38–51)
HCT VFR BLDA CALC: 35 % (ref 38–51)
HCT VFR BLDA CALC: 38 % (ref 38–51)
HCT VFR BLDA CALC: 43 % (ref 38–51)
HGB BLD-MCNC: 12.8 G/DL (ref 13–17.7)
HGB BLD-MCNC: 13.1 G/DL (ref 13–17.7)
HGB BLDA-MCNC: 10.5 G/DL (ref 12–17)
HGB BLDA-MCNC: 11.2 G/DL (ref 12–17)
HGB BLDA-MCNC: 11.2 G/DL (ref 12–17)
HGB BLDA-MCNC: 11.5 G/DL (ref 13.5–17.5)
HGB BLDA-MCNC: 11.9 G/DL (ref 12–17)
HGB BLDA-MCNC: 12.9 G/DL (ref 12–17)
HGB BLDA-MCNC: 14.6 G/DL (ref 12–17)
HGB BLDA-MCNC: 8.8 G/DL (ref 12–17)
HGB BLDA-MCNC: 9.2 G/DL (ref 12–17)
HGB BLDA-MCNC: 9.5 G/DL (ref 12–17)
INHALED O2 CONCENTRATION: 35 %
IPAP: 0
MAGNESIUM SERPL-MCNC: 2.5 MG/DL (ref 1.6–2.6)
MAGNESIUM SERPL-MCNC: 2.5 MG/DL (ref 1.6–2.6)
MCH RBC QN AUTO: 29.6 PG (ref 26.6–33)
MCH RBC QN AUTO: 30.3 PG (ref 26.6–33)
MCHC RBC AUTO-ENTMCNC: 33.8 G/DL (ref 31.5–35.7)
MCHC RBC AUTO-ENTMCNC: 34.3 G/DL (ref 31.5–35.7)
MCV RBC AUTO: 87.5 FL (ref 79–97)
MCV RBC AUTO: 88.4 FL (ref 79–97)
METHGB BLD QL: 0.6 % (ref 0–1.5)
MODALITY: ABNORMAL
NOTE: ABNORMAL
OXYHGB MFR BLDV: 94.7 % (ref 94–99)
PAW @ PEAK INSP FLOW SETTING VENT: 0 CMH2O
PCO2 BLDA: 38.5 MM HG (ref 35–45)
PCO2 BLDA: 39.4 MM HG (ref 35–45)
PCO2 BLDA: 43.2 MM HG (ref 35–45)
PCO2 BLDA: 46.4 MM HG (ref 35–45)
PCO2 BLDA: 47.8 MM HG (ref 35–45)
PCO2 BLDA: 48.7 MM HG (ref 35–45)
PCO2 BLDA: 48.8 MM HG (ref 35–45)
PCO2 BLDA: 49.4 MM HG (ref 35–45)
PCO2 BLDA: 53.3 MM HG (ref 35–45)
PCO2 BLDA: 53.4 MM HG (ref 35–45)
PCO2 BLDA: 54.3 MM HG (ref 35–45)
PCO2 BLDA: 55.7 MM HG (ref 35–45)
PCO2 TEMP ADJ BLD: 38.5 MM HG (ref 35–48)
PH BLDA: 7.17 PH UNITS (ref 7.35–7.6)
PH BLDA: 7.19 PH UNITS (ref 7.35–7.6)
PH BLDA: 7.25 PH UNITS (ref 7.35–7.6)
PH BLDA: 7.29 PH UNITS (ref 7.35–7.6)
PH BLDA: 7.3 PH UNITS (ref 7.35–7.6)
PH BLDA: 7.3 PH UNITS (ref 7.35–7.6)
PH BLDA: 7.32 PH UNITS (ref 7.35–7.6)
PH BLDA: 7.33 PH UNITS (ref 7.35–7.6)
PH BLDA: 7.33 PH UNITS (ref 7.35–7.6)
PH BLDA: 7.34 PH UNITS (ref 7.35–7.6)
PH BLDA: 7.39 PH UNITS (ref 7.35–7.6)
PH BLDA: 7.42 PH UNITS (ref 7.35–7.45)
PH, TEMP CORRECTED: 7.42 PH UNITS
PHOSPHATE SERPL-MCNC: 2.5 MG/DL (ref 2.5–4.5)
PLATELET # BLD AUTO: 240 10*3/MM3 (ref 140–450)
PLATELET # BLD AUTO: 249 10*3/MM3 (ref 140–450)
PMV BLD AUTO: 9.5 FL (ref 6–12)
PMV BLD AUTO: 9.9 FL (ref 6–12)
PO2 BLDA: 127 MMHG (ref 80–105)
PO2 BLDA: 129 MMHG (ref 80–105)
PO2 BLDA: 143 MMHG (ref 80–105)
PO2 BLDA: 190 MMHG (ref 80–105)
PO2 BLDA: 303 MMHG (ref 80–105)
PO2 BLDA: 352 MMHG (ref 80–105)
PO2 BLDA: 362 MMHG (ref 80–105)
PO2 BLDA: 384 MMHG (ref 80–105)
PO2 BLDA: 41 MMHG (ref 80–105)
PO2 BLDA: 68 MMHG (ref 80–105)
PO2 BLDA: 74.5 MM HG (ref 83–108)
PO2 BLDA: 96 MMHG (ref 80–105)
PO2 TEMP ADJ BLD: 74.5 MM HG (ref 83–108)
POTASSIUM BLDA-SCNC: 3.1 MMOL/L (ref 3.5–4.9)
POTASSIUM BLDA-SCNC: 3.4 MMOL/L (ref 3.5–4.9)
POTASSIUM BLDA-SCNC: 3.7 MMOL/L (ref 3.5–4.9)
POTASSIUM BLDA-SCNC: 4 MMOL/L (ref 3.5–4.9)
POTASSIUM BLDA-SCNC: 4.3 MMOL/L (ref 3.5–4.9)
POTASSIUM BLDA-SCNC: 4.3 MMOL/L (ref 3.5–4.9)
POTASSIUM BLDA-SCNC: 4.5 MMOL/L (ref 3.5–4.9)
POTASSIUM BLDA-SCNC: 5.3 MMOL/L (ref 3.5–4.9)
POTASSIUM BLDA-SCNC: 5.7 MMOL/L (ref 3.5–4.9)
POTASSIUM BLDA-SCNC: 6.7 MMOL/L (ref 3.5–4.9)
POTASSIUM BLDA-SCNC: 6.9 MMOL/L (ref 3.5–4.9)
POTASSIUM SERPL-SCNC: 4.2 MMOL/L (ref 3.5–5.2)
POTASSIUM SERPL-SCNC: 4.3 MMOL/L (ref 3.5–5.2)
PSV: 10 CMH2O
QT INTERVAL: 364 MS
QTC INTERVAL: 452 MS
RBC # BLD AUTO: 4.32 10*6/MM3 (ref 4.14–5.8)
RBC # BLD AUTO: 4.33 10*6/MM3 (ref 4.14–5.8)
SAO2 % BLDA: 100 % (ref 95–98)
SAO2 % BLDA: 73 % (ref 95–98)
SAO2 % BLDA: 90 % (ref 95–98)
SAO2 % BLDA: 95 % (ref 95–98)
SAO2 % BLDA: 98 % (ref 95–98)
SAO2 % BLDA: 98 % (ref 95–98)
SAO2 % BLDA: 99 % (ref 95–98)
SODIUM BLD-SCNC: 133 MMOL/L (ref 138–146)
SODIUM BLD-SCNC: 133 MMOL/L (ref 138–146)
SODIUM BLD-SCNC: 134 MMOL/L (ref 138–146)
SODIUM BLD-SCNC: 134 MMOL/L (ref 138–146)
SODIUM BLD-SCNC: 137 MMOL/L (ref 138–146)
SODIUM BLD-SCNC: 138 MMOL/L (ref 138–146)
SODIUM BLD-SCNC: 140 MMOL/L (ref 138–146)
SODIUM BLD-SCNC: 141 MMOL/L (ref 138–146)
SODIUM BLD-SCNC: 142 MMOL/L (ref 138–146)
SODIUM BLD-SCNC: 142 MMOL/L (ref 138–146)
SODIUM BLD-SCNC: 146 MMOL/L (ref 138–146)
SODIUM SERPL-SCNC: 141 MMOL/L (ref 136–145)
SODIUM SERPL-SCNC: 144 MMOL/L (ref 136–145)
TOTAL RATE: 28 BREATHS/MINUTE
UNIT  ABO: NORMAL
UNIT  RH: NORMAL
VENTILATOR MODE: ABNORMAL
VT ON VENT VENT: 0.49 ML
WBC NRBC COR # BLD: 22.66 10*3/MM3 (ref 3.4–10.8)
WBC NRBC COR # BLD: 24.92 10*3/MM3 (ref 3.4–10.8)

## 2023-07-14 PROCEDURE — 99232 SBSQ HOSP IP/OBS MODERATE 35: CPT | Performed by: INTERNAL MEDICINE

## 2023-07-14 PROCEDURE — 25010000002 PROPOFOL 10 MG/ML EMULSION: Performed by: THORACIC SURGERY (CARDIOTHORACIC VASCULAR SURGERY)

## 2023-07-14 PROCEDURE — 99024 POSTOP FOLLOW-UP VISIT: CPT | Performed by: THORACIC SURGERY (CARDIOTHORACIC VASCULAR SURGERY)

## 2023-07-14 PROCEDURE — P9041 ALBUMIN (HUMAN),5%, 50ML: HCPCS | Performed by: PHYSICIAN ASSISTANT

## 2023-07-14 PROCEDURE — 25010000002 ONDANSETRON PER 1 MG: Performed by: PHYSICIAN ASSISTANT

## 2023-07-14 PROCEDURE — 94799 UNLISTED PULMONARY SVC/PX: CPT

## 2023-07-14 PROCEDURE — 82948 REAGENT STRIP/BLOOD GLUCOSE: CPT

## 2023-07-14 PROCEDURE — 82805 BLOOD GASES W/O2 SATURATION: CPT

## 2023-07-14 PROCEDURE — 93005 ELECTROCARDIOGRAM TRACING: CPT | Performed by: PHYSICIAN ASSISTANT

## 2023-07-14 PROCEDURE — 83050 HGB METHEMOGLOBIN QUAN: CPT

## 2023-07-14 PROCEDURE — 85730 THROMBOPLASTIN TIME PARTIAL: CPT | Performed by: THORACIC SURGERY (CARDIOTHORACIC VASCULAR SURGERY)

## 2023-07-14 PROCEDURE — 25010000002 MORPHINE PER 10 MG: Performed by: THORACIC SURGERY (CARDIOTHORACIC VASCULAR SURGERY)

## 2023-07-14 PROCEDURE — 63710000001 ONDANSETRON PER 8 MG: Performed by: PHYSICIAN ASSISTANT

## 2023-07-14 PROCEDURE — 82375 ASSAY CARBOXYHB QUANT: CPT

## 2023-07-14 PROCEDURE — 85027 COMPLETE CBC AUTOMATED: CPT | Performed by: PHYSICIAN ASSISTANT

## 2023-07-14 PROCEDURE — 71045 X-RAY EXAM CHEST 1 VIEW: CPT

## 2023-07-14 PROCEDURE — 93010 ELECTROCARDIOGRAM REPORT: CPT | Performed by: INTERNAL MEDICINE

## 2023-07-14 PROCEDURE — 94003 VENT MGMT INPAT SUBQ DAY: CPT

## 2023-07-14 PROCEDURE — 80048 BASIC METABOLIC PNL TOTAL CA: CPT | Performed by: PHYSICIAN ASSISTANT

## 2023-07-14 PROCEDURE — 25010000002 FENTANYL CITRATE (PF) 50 MCG/ML SOLUTION: Performed by: THORACIC SURGERY (CARDIOTHORACIC VASCULAR SURGERY)

## 2023-07-14 PROCEDURE — 25010000002 ALBUMIN HUMAN 5% PER 50 ML: Performed by: PHYSICIAN ASSISTANT

## 2023-07-14 PROCEDURE — 83735 ASSAY OF MAGNESIUM: CPT | Performed by: THORACIC SURGERY (CARDIOTHORACIC VASCULAR SURGERY)

## 2023-07-14 RX ORDER — ASPIRIN 325 MG
325 TABLET ORAL DAILY
Status: DISCONTINUED | OUTPATIENT
Start: 2023-07-14 | End: 2023-07-18 | Stop reason: HOSPADM

## 2023-07-14 RX ORDER — OXYCODONE HYDROCHLORIDE 10 MG/1
10 TABLET ORAL EVERY 4 HOURS PRN
Status: DISCONTINUED | OUTPATIENT
Start: 2023-07-14 | End: 2023-07-18 | Stop reason: HOSPADM

## 2023-07-14 RX ADMIN — ATORVASTATIN CALCIUM 40 MG: 40 TABLET, FILM COATED ORAL at 21:37

## 2023-07-14 RX ADMIN — Medication 2 G: at 21:36

## 2023-07-14 RX ADMIN — SENNOSIDES AND DOCUSATE SODIUM 2 TABLET: 50; 8.6 TABLET ORAL at 08:21

## 2023-07-14 RX ADMIN — ACETAMINOPHEN 650 MG: 325 TABLET ORAL at 11:10

## 2023-07-14 RX ADMIN — PROPOFOL 70 MCG/KG/MIN: 10 INJECTION, EMULSION INTRAVENOUS at 07:13

## 2023-07-14 RX ADMIN — ONDANSETRON HYDROCHLORIDE 4 MG: 4 TABLET, FILM COATED ORAL at 21:38

## 2023-07-14 RX ADMIN — FENTANYL CITRATE 25 MCG: 50 INJECTION, SOLUTION INTRAMUSCULAR; INTRAVENOUS at 03:58

## 2023-07-14 RX ADMIN — GABAPENTIN 100 MG: 100 CAPSULE ORAL at 08:21

## 2023-07-14 RX ADMIN — DEXMEDETOMIDINE HYDROCHLORIDE 0.5 MCG/KG/HR: 4 INJECTION, SOLUTION INTRAVENOUS at 14:02

## 2023-07-14 RX ADMIN — FENTANYL CITRATE 25 MCG: 50 INJECTION, SOLUTION INTRAMUSCULAR; INTRAVENOUS at 05:30

## 2023-07-14 RX ADMIN — MORPHINE SULFATE 2 MG: 2 INJECTION, SOLUTION INTRAMUSCULAR; INTRAVENOUS at 17:28

## 2023-07-14 RX ADMIN — ACETAMINOPHEN 650 MG: 325 TABLET ORAL at 06:47

## 2023-07-14 RX ADMIN — MORPHINE SULFATE 2 MG: 2 INJECTION, SOLUTION INTRAMUSCULAR; INTRAVENOUS at 22:40

## 2023-07-14 RX ADMIN — ASPIRIN 325 MG: 325 TABLET ORAL at 08:21

## 2023-07-14 RX ADMIN — MORPHINE SULFATE 2 MG: 2 INJECTION, SOLUTION INTRAMUSCULAR; INTRAVENOUS at 07:04

## 2023-07-14 RX ADMIN — FENTANYL CITRATE 25 MCG: 50 INJECTION, SOLUTION INTRAMUSCULAR; INTRAVENOUS at 06:14

## 2023-07-14 RX ADMIN — ONDANSETRON 4 MG: 2 INJECTION INTRAMUSCULAR; INTRAVENOUS at 15:44

## 2023-07-14 RX ADMIN — NOREPINEPHRINE BITARTRATE 0.06 MCG/KG/MIN: 0.03 INJECTION, SOLUTION INTRAVENOUS at 06:26

## 2023-07-14 RX ADMIN — SENNOSIDES AND DOCUSATE SODIUM 2 TABLET: 50; 8.6 TABLET ORAL at 21:37

## 2023-07-14 RX ADMIN — Medication 2 G: at 04:07

## 2023-07-14 RX ADMIN — PROPOFOL 50 MCG/KG/MIN: 10 INJECTION, EMULSION INTRAVENOUS at 02:24

## 2023-07-14 RX ADMIN — PROPOFOL 80 MCG/KG/MIN: 10 INJECTION, EMULSION INTRAVENOUS at 09:51

## 2023-07-14 RX ADMIN — ACETAMINOPHEN 1000 MG: 500 TABLET ORAL at 21:37

## 2023-07-14 RX ADMIN — PROPOFOL 70 MCG/KG/MIN: 10 INJECTION, EMULSION INTRAVENOUS at 11:50

## 2023-07-14 RX ADMIN — DEXMEDETOMIDINE HYDROCHLORIDE 0.2 MCG/KG/HR: 4 INJECTION, SOLUTION INTRAVENOUS at 05:04

## 2023-07-14 RX ADMIN — ALBUMIN (HUMAN) 250 ML: 12.5 INJECTION, SOLUTION INTRAVENOUS at 07:44

## 2023-07-14 RX ADMIN — CHLORHEXIDINE GLUCONATE 15 ML: 1.2 SOLUTION ORAL at 08:21

## 2023-07-14 RX ADMIN — OXYCODONE HYDROCHLORIDE 5 MG: 5 TABLET ORAL at 15:52

## 2023-07-14 RX ADMIN — ONDANSETRON HYDROCHLORIDE 4 MG: 4 TABLET, FILM COATED ORAL at 01:49

## 2023-07-14 RX ADMIN — DEXMEDETOMIDINE HYDROCHLORIDE 1 MCG/KG/HR: 4 INJECTION, SOLUTION INTRAVENOUS at 09:51

## 2023-07-14 RX ADMIN — OXYCODONE HYDROCHLORIDE 5 MG: 5 TABLET ORAL at 18:45

## 2023-07-14 RX ADMIN — MORPHINE SULFATE 2 MG: 2 INJECTION, SOLUTION INTRAMUSCULAR; INTRAVENOUS at 19:41

## 2023-07-14 RX ADMIN — ALBUMIN (HUMAN) 250 ML: 12.5 INJECTION, SOLUTION INTRAVENOUS at 14:38

## 2023-07-14 RX ADMIN — Medication 12.5 MG: at 21:36

## 2023-07-14 RX ADMIN — MORPHINE SULFATE 2 MG: 2 INJECTION, SOLUTION INTRAMUSCULAR; INTRAVENOUS at 14:38

## 2023-07-14 RX ADMIN — OXYCODONE HYDROCHLORIDE 10 MG: 10 TABLET ORAL at 22:46

## 2023-07-14 RX ADMIN — MORPHINE SULFATE 2 MG: 2 INJECTION, SOLUTION INTRAMUSCULAR; INTRAVENOUS at 21:34

## 2023-07-14 RX ADMIN — OXYCODONE HYDROCHLORIDE 5 MG: 5 TABLET ORAL at 01:48

## 2023-07-14 RX ADMIN — FENTANYL CITRATE 25 MCG: 50 INJECTION, SOLUTION INTRAMUSCULAR; INTRAVENOUS at 01:00

## 2023-07-14 RX ADMIN — Medication 2 G: at 13:03

## 2023-07-14 RX ADMIN — PROPOFOL 70 MCG/KG/MIN: 10 INJECTION, EMULSION INTRAVENOUS at 14:02

## 2023-07-14 RX ADMIN — ACETAMINOPHEN 1000 MG: 500 TABLET ORAL at 01:48

## 2023-07-14 RX ADMIN — PROPOFOL 50 MCG/KG/MIN: 10 INJECTION, EMULSION INTRAVENOUS at 01:00

## 2023-07-14 RX ADMIN — OXYCODONE HYDROCHLORIDE 5 MG: 5 TABLET ORAL at 05:34

## 2023-07-14 RX ADMIN — GABAPENTIN 100 MG: 100 CAPSULE ORAL at 21:37

## 2023-07-14 NOTE — CASE MANAGEMENT/SOCIAL WORK
Continued Stay Note  [facility]     Patient Name: Rm Noel  MRN: [id number]  Today's Date: 7/14/2023    Admit Date: 7/11/2023    Plan: TBD   Discharge Plan       Row Name 07/14/23 1541       Plan    Plan TBD    Patient/Family in Agreement with Plan other (see comments)    Plan Comments Per rounds, pt remains on vent, plans to wean. CM will continue to follow.    Final Discharge Disposition Code 30 - still a patient                   Discharge Codes    No documentation.                 Expected Discharge Date and Time       Expected Discharge Date Expected Discharge Time    Jul 19, 2023               Lyle Soto RN

## 2023-07-14 NOTE — PLAN OF CARE
Goal Outcome Evaluation:      Pt alert and oriented x4. Pt extubated to 4LNC at 1531. Pt had 1 episode of emesis right before extubation, no signs of aspiration. VSS. Levo and insulin gtts remain. Adequatew UOP.

## 2023-07-14 NOTE — PROGRESS NOTES
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  He ultimately went a work-up for an abnormal stress test and cardiac cath in July which showed multivessel disease.  He was admitted to Dr. Hernandez and underwent four-vessel CABG.  The nurse reports that postop the patient had VF and had to be defibrillated twice.  An intra-aortic balloon pump was placed.  Patient's rhythm was stable overnight.  He is on low-dose Levophed.  Balloon pump has been turned off    Past medical, surgical, social and family history reviewed in the patient's electronic medical record.    Objective     Vital Sign Min/Max for last 24 hours  Temp  Min: 96.8 °F (36 °C)  Max: 100.4 °F (38 °C)   BP  Min: 90/57  Max: 145/89   Pulse  Min: 84  Max: 107   Resp  Min: 16  Max: 23   SpO2  Min: 96 %  Max: 100 %   No data recorded      Intake/Output Summary (Last 24 hours) at 7/14/2023 0909  Last data filed at 7/14/2023 0800  Gross per 24 hour   Intake 4264 ml   Output 3865 ml   Net 399 ml             Current Facility-Administered Medications:     acetaminophen (TYLENOL) tablet 1,000 mg, 1,000 mg, Oral, Q8H, Florencio Hernandez MD, 1,000 mg at 07/14/23 0148    acetaminophen (TYLENOL) tablet 650 mg, 650 mg, Oral, Q4H PRN, Pilar Rios PA-C, 650 mg at 07/14/23 0647    albumin human 5 % solution 250 mL, 250 mL, Intravenous, PRN, Pilar Rios PA-C, 250 mL at 07/14/23 0744    albuterol (PROVENTIL) nebulizer solution 0.083% 2.5 mg/3mL, 2.5 mg, Nebulization, Q4H PRN, Pilar Rios PA-C    amiodarone 360 mg in 200 mL D5W infusion, 0.5 mg/min, Intravenous, Continuous, Florencio Hernandez MD, Last Rate: 16.67  mL/hr at 07/13/23 2100, 0.5 mg/min at 07/13/23 2100    aspirin tablet 325 mg, 325 mg, Oral, Daily, Elgin Olson, PharmD, 325 mg at 07/14/23 0821    atorvastatin (LIPITOR) tablet 40 mg, 40 mg, Oral, Nightly, Pilar Rios PA-C, 40 mg at 07/13/23 2114    calcium carbonate (TUMS) chewable tablet 500 mg (200 mg elemental), 2 tablet, Oral, TID PRN, Pilar Rios PA-C, 2 tablet at 07/12/23 2001    Calcium Replacement - Follow Nurse / BPA Driven Protocol, , Does not apply, PRN, Pilar Rios PA-C    ceFAZolin in dextrose (ANCEF) IVPB solution 2 g, 2 g, Intravenous, Q8H, Pilar Rios PA-C, 2 g at 07/14/23 0407    chlorhexidine (PERIDEX) 0.12 % solution 15 mL, 15 mL, Mouth/Throat, Q12H, Pilar Rios PA-C, 15 mL at 07/14/23 0821    dexmedetomidine (PRECEDEX) 400 mcg in 100 mL NS infusion, 0.2-1.5 mcg/kg/hr, Intravenous, Continuous PRN, Pilar Rios PA-C, Last Rate: 14.4 mL/hr at 07/14/23 0648, 0.6 mcg/kg/hr at 07/14/23 0648    dextrose (D50W) (25 g/50 mL) IV injection 10-50 mL, 10-50 mL, Intravenous, Q15 Min PRN, Pilar Rios PA-C    dextrose (GLUTOSE) oral gel 15 g, 15 g, Oral, Q15 Min PRN, Pilar Rios PA-C    dextrose 5 % with KCl 20 mEq/L infusion, 30 mL/hr, Intravenous, Continuous, Pilar Rios PA-C, Last Rate: 30 mL/hr at 07/13/23 1921, 30 mL/hr at 07/13/23 1921    DOBUTamine (DOBUTREX) 1 mg/mL infusion, 2-20 mcg/kg/min, Intravenous, Continuous PRN, Pilar Rios PA-C    DOPamine 400 mg in 250 mL D5W infusion, 2-20 mcg/kg/min, Intravenous, Continuous PRN, Pilar Rios PA-C    EPINEPHrine 10 mg in 250 mL NS infusion, 0.02-0.3 mcg/kg/min, Intravenous, Titrated, Pilar Rios PA-C    EPINEPHrine 10 mg in 250 mL NS infusion, 0.02-0.3 mcg/kg/min, Intravenous, Continuous PRN, Pilar Rios PA-C    fentaNYL citrate (PF) (SUBLIMAZE) injection 25 mcg, 25 mcg, Intravenous, Q1H PRN, Florencio Hernandez MD, 25 mcg at 07/14/23 0614    gabapentin (NEURONTIN) capsule 100 mg, 100 mg, Oral, TID,  Florencio Hernandez MD, 100 mg at 07/14/23 0821    glucagon (GLUCAGEN) injection 1 mg, 1 mg, Intramuscular, Q15 Min PRN, Pilar Rios PA-C    insulin regular 1 unit/mL in 0.9% sodium chloride (Glucommander), 0-100 Units/hr, Intravenous, Titrated, Pilar Rios PA-C, Last Rate: 2.3 mL/hr at 07/14/23 0631, 2.3 Units/hr at 07/14/23 0631    ipratropium-albuterol (DUO-NEB) nebulizer solution 3 mL, 3 mL, Nebulization, Q4H PRN, Pilar Rios PA-C    Magnesium Cardiology Dose Replacement - Follow Nurse / BPA Driven Protocol, , Does not apply, PRN, Pilar Rios PA-C    meperidine (DEMEROL) injection 25 mg, 25 mg, Intravenous, Q4H PRN, Florencio Hernandez MD    metoprolol tartrate (LOPRESSOR) half tablet 12.5 mg, 12.5 mg, Oral, Q12H, Pilar Rios PA-C    metoprolol tartrate (LOPRESSOR) injection 2.5 mg, 2.5 mg, Intravenous, Q6H, Pilar Rios PA-C    morphine injection 2 mg, 2 mg, Intravenous, Q30 Min PRN, Florencio Hernandez MD, 2 mg at 07/14/23 0704    naloxone (NARCAN) injection 0.2 mg, 0.2 mg, Intravenous, PRN, Florencio Hernandez MD    niCARdipine (CARDENE) 25mg in 250mL NS infusion, 5-15 mg/hr, Intravenous, Continuous PRN, Pilar Rios PA-C    nitroglycerin (NITROSTAT) SL tablet 0.4 mg, 0.4 mg, Sublingual, Q5 Min PRN, Pilar Rios PA-C    nitroglycerin (TRIDIL) 200 mcg/ml infusion, 5-200 mcg/min, Intravenous, Continuous PRN, Pilar Rios PA-C    norepinephrine (LEVOPHED) 8 mg in 250 mL NS infusion (premix), 0.02-0.3 mcg/kg/min, Intravenous, Continuous PRN, Pilar Rios PA-C, Last Rate: 19.74 mL/hr at 07/14/23 0643, 0.11 mcg/kg/min at 07/14/23 0643    ondansetron (ZOFRAN) tablet 4 mg, 4 mg, Oral, Q6H PRN, 4 mg at 07/14/23 0149 **OR** ondansetron (ZOFRAN) injection 4 mg, 4 mg, Intravenous, Q6H PRN, Pilar Rios PA-C    ondansetron (ZOFRAN) injection 4 mg, 4 mg, Intravenous, Q6H PRN, Pilar Rios PA-C    oxyCODONE (ROXICODONE) immediate release tablet 5 mg, 5 mg, Oral, Q4H PRN, David  Florencio MORRELL MD, 5 mg at 07/14/23 0534    Pharmacy Consult - [place], , Does not apply, Daily, Pilar Rios PA-C    phenylephrine (SHANI-SYNEPHRINE) 50 mg in sodium chloride 0.9 % 250 mL infusion, 0.5-3 mcg/kg/min, Intravenous, Continuous PRN, Pilar Rios PA-C    Phosphorus Replacement - Follow Nurse / BPA Driven Protocol, , Does not apply, PRN, Pilar Rios PA-C    Potassium Replacement - Follow Nurse / BPA Driven Protocol, , Does not apply, PRN, Pilar Rios PA-C    propofol (DIPRIVAN) infusion 10 mg/mL 100 mL, 5-50 mcg/kg/min, Intravenous, Continuous, Florencio Hernandez MD, Last Rate: 40.2 mL/hr at 07/14/23 0713, 70 mcg/kg/min at 07/14/23 0713    racemic epinephrine (RACEPINEPHRINE) nebulizer solution 0.5 mL, 0.5 mL, Nebulization, Once PRN, Pilar Rios PA-C    sennosides-docusate (PERICOLACE) 8.6-50 MG per tablet 2 tablet, 2 tablet, Oral, BID, Pilar Rios PA-C, 2 tablet at 07/14/23 0821    sodium bicarbonate injection 8.4% 50 mEq, 50 mEq, Intravenous, Once PRN, Pilar Rios PA-C    Physical Exam: General well-developed young male who remains intubated and sedated following CABG        HEENT: Central line and [place] is present.  ET tube is present       Respiratory: Equal bilateral symmetrical expansion with rare crackle       Cardiovascular: Regular rate and rhythm with a rub and no edema palpation       GI: Soft and flat       Lower Extremities: No lesions       Neuro: Cannot assess due to intubation and sedation       Skin: Warm and dry no edema palpation       Psych: Cannot assess due to intubation and sedation    Results Review: Heart rate 87-91.  Blood pressure is 91-1 10 systolic.  GFR 90.2.  White blood cell count 24,000    Radiology Results:  Imaging Results (Last 72 Hours)       Procedure Component Value Units Date/Time    XR Chest 1 View [[id number]] Collected: 07/14/23 0714     Updated: 07/14/23 0724    Narrative:      XR CHEST 1 VW    Date of Exam: 7/14/2023 3:35 AM EDT    Indication:  Post-Op Heart Surgery    Comparison: 7/13/2023.    Findings:  Endotracheal tube tip in the mid trachea. Enteric tube is present with incomplete visualization of the tip. There is a right internal jugular catheter sheath in place. Stable left-sided chest tube present. No pneumothorax. The heart appears enlarged.   Median sternotomy wires are present. Lung volumes are low. There is mild indistinctness of the pulmonary vasculature. Mild bibasal atelectatic changes are present. Small left-sided pleural effusion present.      Impression:      Impression:  Mild pulmonary edema pattern with small left-sided pleural effusion, similar as compared to the previous study. No pneumothorax.      Electronically Signed: Grecia Mart    7/14/2023 7:21 AM EDT    Workstation ID: [id number]    XR Chest 1 View [[id number]] Collected: 07/13/23 1902     Updated: 07/13/23 1908    Narrative:      XR CHEST 1 VW    Date of Exam: 7/13/2023 6:36 PM EDT    Indication: Post-Op Check Line & Tube Placement    Comparison: Preoperative chest x-ray 7/12/2023    Findings:  There are new postoperative changes from CABG. Tip of the endotracheal tube terminates in the midthoracic trachea approximately 5 cm above the piter. Tip of the enteric tube terminates in the gastric fundus. Radiopaque marker of the intra-aortic balloon   pump is located at the level of the proximal descending thoracic aorta. There is a right internal jugular sheath in place. Chest tubes are noted.    No pneumothorax is identified on this semierect exam. There are patchy left basilar opacities.      Impression:      Impression:  1.Interval postoperative changes with positioning of support tubes and lines, as above.  2.No pneumothorax visible on this semierect exam.  3.Patchy left basilar opacities, likely due to atelectasis.      Electronically Signed: Amber Rodgers    7/13/2023 7:05 PM EDT    Workstation ID: [id number]    XR Chest 1 View [[id number]] Collected: 07/12/23 0746     Updated:  07/12/23 0750    Narrative:      XR CHEST 1 VW    Date of Exam: 7/12/2023 3:46 AM EDT    Indication: PRE-CABG    Comparison: None available.    Findings:  Lungs are normally expanded. Mild cardiomegaly. No significant pneumothorax, pleural effusion or focal pulmonary parenchymal opacity. Bones and soft tissues are within normal limits.    Impression:      No acute cardiopulmonary abnormality.          Electronically Signed: Bela Sterling    7/12/2023 7:47 AM EDT    Workstation ID: [id number]            EKG: Sinus rhythm no ischemia incomplete right bundle branch block    ECHO: Normal preop EF 61 to 65%    Tele: Sinus rhythm    Assessment   Assessment/Plan: Patient has new onset exertional chest pain in November and evaluation showed multivessel CAD.  Status post four-vessel CABG yesterday.  Postop she had VF and required defibrillation x2.  Rhythm has been stable overnight.  Continue beta-blocker and aspirin  2 hyperlipidemia-Lipitor 40 mg has been started.    Florencio Hinson MD  07/14/23  09:09 EDT

## 2023-07-14 NOTE — PROGRESS NOTES
Intensive Care Follow-up     Hospital:  LOS: 3 days   Mr. Rm Noel, 43 y.o. male is followed for:   CAD (coronary artery disease)   Followed postoperatively for ventilator weaning     Subjective     43-year-old male admitted to the intensive care unit on 7/13/2023 after having undergone a CABG x4 with intra-aortic balloon pump placement.     Patient was moved to the intensive care unit on multiple continuous infusions and interactive balloon pump at one-to-one ratio.     History of tobacco abuse up till this illness.  Carries a diagnosis of COPD although preoperative spirometry failed to exhibit airway obstruction.  Interval History:  The chart has been reviewed.  The patient is currently off pressors.  He is receiving an insulin infusion.  Balloon pump was removed.  He remains on the ventilator.  Currently febrile to 103.3 °F.    The patient's past medical, surgical and social history were reviewed and updated in Epic as appropriate.        Objective     Infusions:  dexmedetomidine, 0.2-1.5 mcg/kg/hr, Last Rate: 1 mcg/kg/hr (07/14/23 0951)  dextrose 5 % with KCl 20 mEq, 30 mL/hr, Last Rate: 30 mL/hr (07/13/23 1921)  EPINEPHrine, 0.02-0.3 mcg/kg/min  EPINEPHrine, 0.02-0.3 mcg/kg/min  insulin, 0-100 Units/hr, Last Rate: 1.6 Units/hr (07/14/23 1219)  niCARdipine, 5-15 mg/hr  nitroglycerin, 5-200 mcg/min  norepinephrine, 0.02-0.3 mcg/kg/min, Last Rate: 0.08 mcg/kg/min (07/14/23 1041)  phenylephrine, 0.5-3 mcg/kg/min  propofol, 5-50 mcg/kg/min, Last Rate: 70 mcg/kg/min (07/14/23 1150)      Medications:  acetaminophen, 1,000 mg, Oral, Q8H  aspirin, 325 mg, Oral, Daily  atorvastatin, 40 mg, Oral, Nightly  ceFAZolin, 2 g, Intravenous, Q8H  chlorhexidine, 15 mL, Mouth/Throat, Q12H  gabapentin, 100 mg, Oral, TID  metoprolol tartrate, 12.5 mg, Oral, Q12H  pharmacy consult - [facility], , Does not apply, Daily  senna-docusate sodium, 2 tablet, Oral, BID        Vital Sign Min/Max for last 24 hours  Temp  Min: 96.8 °F (36 °C)   "Max: 101.3 °F (38.5 °C)   BP  Min: 90/57  Max: 132/57   Pulse  Min: 87  Max: 107   Resp  Min: 16  Max: 23   SpO2  Min: 96 %  Max: 100 %   No data recorded       Input/Output for last 24 hour shift  07/13 0701 - 07/14 0700  In: 4264 [I.V.:1697]  Out: 3720 [Urine:3320]   FiO2 (%):  [35 %-100 %] 35 %  S RR:  [9-16] 16  PEEP/CPAP (cm H2O):  [5 cm H20] 5 cm H20  ID SUP:  [10 cm H20] 10 cm H20  MAP (cm H2O):  [8.2-10] 10  Objective:  General Appearance:  Uncomfortable and ill-appearing.    Vital signs: (most recent): Blood pressure 107/61, pulse 87, temperature (!) 101.3 °F (38.5 °C), temperature source Bladder, resp. rate 23, height 175.3 cm (69.02\"), weight 95.7 kg (210 lb 15.7 oz), SpO2 97 %.    HEENT: (Endotracheal tube in place.  Right internal jugular introducer.)    Lungs:  Normal effort and normal respiratory rate.  Breath sounds clear to auscultation.    Heart: Normal rate.  Regular rhythm.  S1 normal and S2 normal.  Positive for friction rub.  No murmur.   Abdomen: Abdomen is soft.  Bowel sounds are normal.     Extremities: There is no dependent edema.    Neurological: (Currently sedated on the ventilator.).    Pupils:  Pupils are equal, round, and reactive to light.  Pupils are equal.             Results from last 7 days   Lab Units 07/14/23  0359 07/13/23 2356 07/13/23 1916   WBC 10*3/mm3 24.92* 22.66* 10.29   HEMOGLOBIN g/dL 13.1 12.8* 14.5   PLATELETS 10*3/mm3 240 249 214     Results from last 7 days   Lab Units 07/14/23  0359 07/13/23 2356 07/13/23 1916   SODIUM mmol/L 141 144 144  144   POTASSIUM mmol/L 4.3 4.2 3.8  3.8   CO2 mmol/L 23.0 24.0 23.0  23.0   BUN mg/dL 14 14 13  13   CREATININE mg/dL 1.04 1.12 0.97  0.97   MAGNESIUM mg/dL 2.5 2.5 1.6   PHOSPHORUS mg/dL  --  2.5 6.0*   GLUCOSE mg/dL 169* 154* 125*  125*     Estimated Creatinine Clearance: 102.4 mL/min (by C-G formula based on SCr of 1.04 mg/dL).    Results from last 7 days   Lab Units 07/13/23  1924   PH, ARTERIAL pH units 7.292* "   PCO2, ARTERIAL mm Hg 50.3*   PO2 ART mm Hg 182.0*         I reviewed the patient's results and images.     Assessment & Plan   Impression        CAD (coronary artery disease) (S/P CABG x 4 w/ IABP 7/13/23)    Tobacco abuse    H/O COPD (No obstruction on pre-op venita)       Plan        Continue to wean ventilator as able.  Plan is for extubation later this afternoon if he meets criteria.  Continue with insulin infusion.  We will convert over to subcutaneous when able.  Pressors as needed.    Plan of care and goals reviewed with mulitdisciplinary/antibiotic stewardship team during rounds.   I discussed the patient's findings and my recommendations with nursing staff       Paolo Calderón MD, [facility]  Pulmonology and Critical Care Medicine

## 2023-07-14 NOTE — PROGRESS NOTES
CTS Progress Note       LOS: 3 days   Patient Care Team:  Rigoberto Solano DO as PCP - General (Family Medicine)    Chief Complaint: CAD (coronary artery disease)    Vital Signs:  Temp:  [96.8 °F (36 °C)-99.3 °F (37.4 °C)] 99.1 °F (37.3 °C)  Heart Rate:  [] 89  Resp:  [16-23] 20  BP: ()/(40-89) 91/70  FiO2 (%):  [50 %-100 %] 50 %    Physical Exam: Intubated.  Sedated.  Minimal pressors.  Intra-aortic balloon pump in place groin sites are satisfactory     Results:     Results from last 7 days   Lab Units 07/14/23  0359   WBC 10*3/mm3 24.92*   HEMOGLOBIN g/dL 13.1   HEMATOCRIT % 38.2   PLATELETS 10*3/mm3 240     Results from last 7 days   Lab Units 07/14/23  0359   SODIUM mmol/L 141   POTASSIUM mmol/L 4.3   CHLORIDE mmol/L 106   CO2 mmol/L 23.0   BUN mg/dL 14   CREATININE mg/dL 1.04   GLUCOSE mg/dL 169*   CALCIUM mg/dL 8.5*           Imaging Results (Last 24 Hours)       Procedure Component Value Units Date/Time    XR Chest 1 View [[id number]] Resulted: 07/14/23 0419     Updated: 07/14/23 0421    XR Chest 1 View [[id number]] Collected: 07/13/23 1902     Updated: 07/13/23 1908    Narrative:      XR CHEST 1 VW    Date of Exam: 7/13/2023 6:36 PM EDT    Indication: Post-Op Check Line & Tube Placement    Comparison: Preoperative chest x-ray 7/12/2023    Findings:  There are new postoperative changes from CABG. Tip of the endotracheal tube terminates in the midthoracic trachea approximately 5 cm above the piter. Tip of the enteric tube terminates in the gastric fundus. Radiopaque marker of the intra-aortic balloon   pump is located at the level of the proximal descending thoracic aorta. There is a right internal jugular sheath in place. Chest tubes are noted.    No pneumothorax is identified on this semierect exam. There are patchy left basilar opacities.      Impression:      Impression:  1.Interval postoperative changes with positioning of support tubes and lines, as above.  2.No pneumothorax visible on this  semierect exam.  3.Patchy left basilar opacities, likely due to atelectasis.      Electronically Signed: Amber Vishal    7/13/2023 7:05 PM EDT    Workstation ID: [id number]            Assessment      CAD (coronary artery disease) (S/P CABG x 4 w/ IABP 7/13/23)    Tobacco abuse    H/O COPD (No obstruction on pre-op venita)    Globin has remained stable and we have not had to escalate pressor agents.  Creatinine is also stabilized and normal.  Will place balloon pump at 1-3 and decrease the augmentation and observe for a few hours with anticipation of balloon pump removal this morning    Plan   Continuing supportive care    Please note that portions of this note were completed with a voice recognition program. Efforts were made to edit the dictations, but occasionally words are mistranscribed.    Florencio Hernandez MD  07/14/23  06:26 EDT

## 2023-07-15 ENCOUNTER — APPOINTMENT (OUTPATIENT)
Dept: GENERAL RADIOLOGY | Facility: HOSPITAL | Age: 43
DRG: 236 | End: 2023-07-15
Payer: MEDICAID

## 2023-07-15 LAB
ANION GAP SERPL CALCULATED.3IONS-SCNC: 12 MMOL/L (ref 5–15)
BACTERIA UR QL AUTO: ABNORMAL /HPF
BILIRUB UR QL STRIP: NEGATIVE
BUN SERPL-MCNC: 13 MG/DL (ref 6–20)
BUN/CREAT SERPL: 17.8 (ref 7–25)
CALCIUM SPEC-SCNC: 8.6 MG/DL (ref 8.6–10.5)
CHLORIDE SERPL-SCNC: 97 MMOL/L (ref 98–107)
CLARITY UR: ABNORMAL
CO2 SERPL-SCNC: 24 MMOL/L (ref 22–29)
COLOR UR: YELLOW
CREAT SERPL-MCNC: 0.73 MG/DL (ref 0.76–1.27)
DEPRECATED RDW RBC AUTO: 42.4 FL (ref 37–54)
EGFRCR SERPLBLD CKD-EPI 2021: 114.3 ML/MIN/1.73
ERYTHROCYTE [DISTWIDTH] IN BLOOD BY AUTOMATED COUNT: 13.6 % (ref 12.3–15.4)
GLUCOSE BLDC GLUCOMTR-MCNC: 119 MG/DL (ref 70–130)
GLUCOSE BLDC GLUCOMTR-MCNC: 141 MG/DL (ref 70–130)
GLUCOSE BLDC GLUCOMTR-MCNC: 145 MG/DL (ref 70–130)
GLUCOSE BLDC GLUCOMTR-MCNC: 146 MG/DL (ref 70–130)
GLUCOSE BLDC GLUCOMTR-MCNC: 161 MG/DL (ref 70–130)
GLUCOSE BLDC GLUCOMTR-MCNC: 163 MG/DL (ref 70–130)
GLUCOSE BLDC GLUCOMTR-MCNC: 169 MG/DL (ref 70–130)
GLUCOSE BLDC GLUCOMTR-MCNC: 175 MG/DL (ref 70–130)
GLUCOSE SERPL-MCNC: 160 MG/DL (ref 65–99)
GLUCOSE UR STRIP-MCNC: NEGATIVE MG/DL
GRAN CASTS URNS QL MICRO: ABNORMAL /LPF
HCT VFR BLD AUTO: 30.3 % (ref 37.5–51)
HGB BLD-MCNC: 10.2 G/DL (ref 13–17.7)
HGB UR QL STRIP.AUTO: ABNORMAL
HYALINE CASTS UR QL AUTO: ABNORMAL /LPF
KETONES UR QL STRIP: NEGATIVE
LEUKOCYTE ESTERASE UR QL STRIP.AUTO: ABNORMAL
MCH RBC QN AUTO: 29.1 PG (ref 26.6–33)
MCHC RBC AUTO-ENTMCNC: 33.7 G/DL (ref 31.5–35.7)
MCV RBC AUTO: 86.6 FL (ref 79–97)
MUCOUS THREADS URNS QL MICRO: ABNORMAL /HPF
NITRITE UR QL STRIP: NEGATIVE
NT-PROBNP SERPL-MCNC: 1081 PG/ML (ref 0–450)
PH UR STRIP.AUTO: 5.5 [PH] (ref 5–8)
PLATELET # BLD AUTO: 165 10*3/MM3 (ref 140–450)
PMV BLD AUTO: 9.9 FL (ref 6–12)
POTASSIUM SERPL-SCNC: 4.2 MMOL/L (ref 3.5–5.2)
PROT UR QL STRIP: NEGATIVE
RBC # BLD AUTO: 3.5 10*6/MM3 (ref 4.14–5.8)
RBC # UR STRIP: ABNORMAL /HPF
REF LAB TEST METHOD: ABNORMAL
SODIUM SERPL-SCNC: 133 MMOL/L (ref 136–145)
SP GR UR STRIP: 1.02 (ref 1–1.03)
SQUAMOUS #/AREA URNS HPF: ABNORMAL /HPF
UROBILINOGEN UR QL STRIP: ABNORMAL
WBC # UR STRIP: ABNORMAL /HPF
WBC NRBC COR # BLD: 18.49 10*3/MM3 (ref 3.4–10.8)

## 2023-07-15 PROCEDURE — 83880 ASSAY OF NATRIURETIC PEPTIDE: CPT | Performed by: INTERNAL MEDICINE

## 2023-07-15 PROCEDURE — 87077 CULTURE AEROBIC IDENTIFY: CPT | Performed by: PHYSICIAN ASSISTANT

## 2023-07-15 PROCEDURE — 97162 PT EVAL MOD COMPLEX 30 MIN: CPT

## 2023-07-15 PROCEDURE — 93005 ELECTROCARDIOGRAM TRACING: CPT | Performed by: PHYSICIAN ASSISTANT

## 2023-07-15 PROCEDURE — 25010000002 ONDANSETRON PER 1 MG: Performed by: PHYSICIAN ASSISTANT

## 2023-07-15 PROCEDURE — 81001 URINALYSIS AUTO W/SCOPE: CPT | Performed by: PHYSICIAN ASSISTANT

## 2023-07-15 PROCEDURE — 25010000002 FUROSEMIDE PER 20 MG: Performed by: THORACIC SURGERY (CARDIOTHORACIC VASCULAR SURGERY)

## 2023-07-15 PROCEDURE — 87186 SC STD MICRODIL/AGAR DIL: CPT | Performed by: PHYSICIAN ASSISTANT

## 2023-07-15 PROCEDURE — 71045 X-RAY EXAM CHEST 1 VIEW: CPT

## 2023-07-15 PROCEDURE — 0 NITROGLYCERIN 200 MCG/ML SOLUTION: Performed by: PHYSICIAN ASSISTANT

## 2023-07-15 PROCEDURE — 25010000002 MORPHINE PER 10 MG: Performed by: THORACIC SURGERY (CARDIOTHORACIC VASCULAR SURGERY)

## 2023-07-15 PROCEDURE — 99291 CRITICAL CARE FIRST HOUR: CPT | Performed by: INTERNAL MEDICINE

## 2023-07-15 PROCEDURE — 82948 REAGENT STRIP/BLOOD GLUCOSE: CPT

## 2023-07-15 PROCEDURE — 97530 THERAPEUTIC ACTIVITIES: CPT

## 2023-07-15 PROCEDURE — 63710000001 INSULIN DETEMIR PER 5 UNITS: Performed by: INTERNAL MEDICINE

## 2023-07-15 PROCEDURE — 93005 ELECTROCARDIOGRAM TRACING: CPT | Performed by: STUDENT IN AN ORGANIZED HEALTH CARE EDUCATION/TRAINING PROGRAM

## 2023-07-15 PROCEDURE — 25010000002 KETOROLAC TROMETHAMINE PER 15 MG: Performed by: INTERNAL MEDICINE

## 2023-07-15 PROCEDURE — 80048 BASIC METABOLIC PNL TOTAL CA: CPT | Performed by: PHYSICIAN ASSISTANT

## 2023-07-15 PROCEDURE — 85027 COMPLETE CBC AUTOMATED: CPT | Performed by: PHYSICIAN ASSISTANT

## 2023-07-15 PROCEDURE — 99232 SBSQ HOSP IP/OBS MODERATE 35: CPT | Performed by: INTERNAL MEDICINE

## 2023-07-15 PROCEDURE — 99024 POSTOP FOLLOW-UP VISIT: CPT | Performed by: STUDENT IN AN ORGANIZED HEALTH CARE EDUCATION/TRAINING PROGRAM

## 2023-07-15 PROCEDURE — 93010 ELECTROCARDIOGRAM REPORT: CPT | Performed by: STUDENT IN AN ORGANIZED HEALTH CARE EDUCATION/TRAINING PROGRAM

## 2023-07-15 PROCEDURE — 87086 URINE CULTURE/COLONY COUNT: CPT | Performed by: PHYSICIAN ASSISTANT

## 2023-07-15 RX ORDER — DEXTROSE MONOHYDRATE 25 G/50ML
25 INJECTION, SOLUTION INTRAVENOUS
Status: DISCONTINUED | OUTPATIENT
Start: 2023-07-15 | End: 2023-07-18 | Stop reason: HOSPADM

## 2023-07-15 RX ORDER — NICOTINE POLACRILEX 4 MG
15 LOZENGE BUCCAL
Status: DISCONTINUED | OUTPATIENT
Start: 2023-07-15 | End: 2023-07-18 | Stop reason: HOSPADM

## 2023-07-15 RX ORDER — INSULIN LISPRO 100 [IU]/ML
2-9 INJECTION, SOLUTION INTRAVENOUS; SUBCUTANEOUS
Status: DISCONTINUED | OUTPATIENT
Start: 2023-07-15 | End: 2023-07-18 | Stop reason: HOSPADM

## 2023-07-15 RX ORDER — IBUPROFEN 600 MG/1
1 TABLET ORAL
Status: DISCONTINUED | OUTPATIENT
Start: 2023-07-15 | End: 2023-07-18 | Stop reason: HOSPADM

## 2023-07-15 RX ORDER — FUROSEMIDE 10 MG/ML
40 INJECTION INTRAMUSCULAR; INTRAVENOUS ONCE
Status: COMPLETED | OUTPATIENT
Start: 2023-07-15 | End: 2023-07-15

## 2023-07-15 RX ORDER — NEBIVOLOL 5 MG/1
5 TABLET ORAL
Status: DISCONTINUED | OUTPATIENT
Start: 2023-07-15 | End: 2023-07-16

## 2023-07-15 RX ORDER — KETOROLAC TROMETHAMINE 30 MG/ML
30 INJECTION, SOLUTION INTRAMUSCULAR; INTRAVENOUS ONCE
Status: COMPLETED | OUTPATIENT
Start: 2023-07-15 | End: 2023-07-15

## 2023-07-15 RX ADMIN — Medication 2 G: at 04:04

## 2023-07-15 RX ADMIN — GABAPENTIN 100 MG: 100 CAPSULE ORAL at 08:47

## 2023-07-15 RX ADMIN — ASPIRIN 325 MG: 325 TABLET ORAL at 08:47

## 2023-07-15 RX ADMIN — NITROGLYCERIN 90 MCG/MIN: 20 INJECTION INTRAVENOUS at 08:47

## 2023-07-15 RX ADMIN — NEBIVOLOL 5 MG: 5 TABLET ORAL at 05:24

## 2023-07-15 RX ADMIN — INSULIN DETEMIR 8 UNITS: 100 INJECTION, SOLUTION SUBCUTANEOUS at 12:08

## 2023-07-15 RX ADMIN — ACETAMINOPHEN 1000 MG: 500 TABLET ORAL at 20:29

## 2023-07-15 RX ADMIN — ATORVASTATIN CALCIUM 40 MG: 40 TABLET, FILM COATED ORAL at 20:30

## 2023-07-15 RX ADMIN — SENNOSIDES AND DOCUSATE SODIUM 2 TABLET: 50; 8.6 TABLET ORAL at 08:47

## 2023-07-15 RX ADMIN — ONDANSETRON 4 MG: 2 INJECTION INTRAMUSCULAR; INTRAVENOUS at 05:06

## 2023-07-15 RX ADMIN — ACETAMINOPHEN 1000 MG: 500 TABLET ORAL at 14:00

## 2023-07-15 RX ADMIN — KETOROLAC TROMETHAMINE 30 MG: 30 INJECTION, SOLUTION INTRAMUSCULAR; INTRAVENOUS at 05:06

## 2023-07-15 RX ADMIN — MORPHINE SULFATE 2 MG: 2 INJECTION, SOLUTION INTRAMUSCULAR; INTRAVENOUS at 05:06

## 2023-07-15 RX ADMIN — FUROSEMIDE 40 MG: 10 INJECTION, SOLUTION INTRAMUSCULAR; INTRAVENOUS at 09:47

## 2023-07-15 RX ADMIN — OXYCODONE HYDROCHLORIDE 10 MG: 10 TABLET ORAL at 09:47

## 2023-07-15 RX ADMIN — GABAPENTIN 100 MG: 100 CAPSULE ORAL at 20:29

## 2023-07-15 RX ADMIN — MORPHINE SULFATE 2 MG: 2 INJECTION, SOLUTION INTRAMUSCULAR; INTRAVENOUS at 00:21

## 2023-07-15 RX ADMIN — ONDANSETRON 4 MG: 2 INJECTION INTRAMUSCULAR; INTRAVENOUS at 17:30

## 2023-07-15 RX ADMIN — NITROGLYCERIN 5 MCG/MIN: 20 INJECTION INTRAVENOUS at 00:04

## 2023-07-15 RX ADMIN — OXYCODONE HYDROCHLORIDE 10 MG: 10 TABLET ORAL at 03:54

## 2023-07-15 RX ADMIN — OXYCODONE HYDROCHLORIDE 10 MG: 10 TABLET ORAL at 19:54

## 2023-07-15 RX ADMIN — SENNOSIDES AND DOCUSATE SODIUM 2 TABLET: 50; 8.6 TABLET ORAL at 20:30

## 2023-07-15 RX ADMIN — ONDANSETRON 4 MG: 2 INJECTION INTRAMUSCULAR; INTRAVENOUS at 10:28

## 2023-07-15 RX ADMIN — MORPHINE SULFATE 2 MG: 2 INJECTION, SOLUTION INTRAMUSCULAR; INTRAVENOUS at 02:17

## 2023-07-15 RX ADMIN — ACETAMINOPHEN 1000 MG: 500 TABLET ORAL at 05:06

## 2023-07-15 NOTE — PROGRESS NOTES
[facility]  IP Progress Note      Chief Complaint/Reason for service: #1 postop management hypertension hyperlipidemia    Subjective   Subjective: Patient is awake and alert.  The nurse reports the patient's heart rates up this morning.  Also seems anxious.    Past medical, surgical, social and family history reviewed in the patient's electronic medical record.    Objective     Vital Sign Min/Max for last 24 hours  Temp  Min: 99.1 °F (37.3 °C)  Max: 101.3 °F (38.5 °C)   BP  Min: 91/70  Max: 146/76   Pulse  Min: 80  Max: 105   Resp  Min: 20  Max: 30   SpO2  Min: 90 %  Max: 99 %   Flow (L/min)  Min: 4  Max: 6      Intake/Output Summary (Last 24 hours) at 7/15/2023 0440  Last data filed at 7/15/2023 0200  Gross per 24 hour   Intake 1087 ml   Output 2160 ml   Net -1073 ml             Current Facility-Administered Medications:     acetaminophen (TYLENOL) tablet 1,000 mg, 1,000 mg, Oral, Q8H, Florencio Hernandez MD, 1,000 mg at 07/14/23 2137    acetaminophen (TYLENOL) tablet 650 mg, 650 mg, Oral, Q4H PRN, Pilar Rios PA-C, 650 mg at 07/14/23 1110    albumin human 5 % solution 250 mL, 250 mL, Intravenous, PRN, Pilar Rios PA-C, 250 mL at 07/14/23 1438    aspirin tablet 325 mg, 325 mg, Oral, Daily, Elgin Olson, PharmD, 325 mg at 07/14/23 0821    atorvastatin (LIPITOR) tablet 40 mg, 40 mg, Oral, Nightly, Pilar Rios PA-C, 40 mg at 07/14/23 2137    calcium carbonate (TUMS) chewable tablet 500 mg (200 mg elemental), 2 tablet, Oral, TID PRN, Pilar Rios PA-C, 2 tablet at 07/12/23 2001    Calcium Replacement - Follow Nurse / BPA Driven Protocol, , Does not apply, PRN, Pilar Rios PA-C    dextrose (D50W) (25 g/50 mL) IV injection 10-50 mL, 10-50 mL, Intravenous, Q15 Min PRN, Pilar Rios PA-C    dextrose (GLUTOSE) oral gel 15 g, 15 g, Oral, Q15 Min Gabriel NAPOLES Rachel, PA-C    dextrose 5 % with KCl 20 mEq/L infusion, 30 mL/hr, Intravenous, Continuous, Gabriel,  [facility] Quezada, Last Rate: 30 mL/hr at 07/13/23 1921, 30 mL/hr at 07/13/23 1921    EPINEPHrine 10 mg in 250 mL NS infusion, 0.02-0.3 mcg/kg/min, Intravenous, Titrated, Pilar Rios PA-C    EPINEPHrine 10 mg in 250 mL NS infusion, 0.02-0.3 mcg/kg/min, Intravenous, Continuous PRN, Pilar Rios PA-C    gabapentin (NEURONTIN) capsule 100 mg, 100 mg, Oral, TID, Florencio Hernandez MD, 100 mg at 07/14/23 2137    glucagon (GLUCAGEN) injection 1 mg, 1 mg, Intramuscular, Q15 Min PRN, Pilar Rios PA-C    insulin regular 1 unit/mL in 0.9% sodium chloride (Glucommander), 0-100 Units/hr, Intravenous, Titrated, Pilar Rios PA-C, Last Rate: 1.7 mL/hr at 07/15/23 0400, 1.7 Units/hr at 07/15/23 0400    ipratropium-albuterol (DUO-NEB) nebulizer solution 3 mL, 3 mL, Nebulization, Q4H PRN, Pilar Rios PA-C    Magnesium Cardiology Dose Replacement - Follow Nurse / BPA Driven Protocol, , Does not apply, PRN, Pilar Rios PA-C    metoprolol tartrate (LOPRESSOR) half tablet 12.5 mg, 12.5 mg, Oral, Q12H, Pilar Rios PA-C, 12.5 mg at 07/14/23 2136    morphine injection 2 mg, 2 mg, Intravenous, Q30 Min PRN, Florencio Hernandez MD, 2 mg at 07/15/23 0217    naloxone (NARCAN) injection 0.2 mg, 0.2 mg, Intravenous, PRN, Florencio Hernandez MD    niCARdipine (CARDENE) 25mg in 250mL NS infusion, 5-15 mg/hr, Intravenous, Continuous PRN, Pilar Rios PA-C    nitroglycerin (NITROSTAT) SL tablet 0.4 mg, 0.4 mg, Sublingual, Q5 Min PRN, Pilar Rios PA-C    nitroglycerin (TRIDIL) 200 mcg/ml infusion, 5-200 mcg/min, Intravenous, Continuous PRN, Pilar Rios PA-C, Last Rate: 1.5 mL/hr at 07/15/23 0004, 5 mcg/min at 07/15/23 0004    norepinephrine (LEVOPHED) 8 mg in 250 mL NS infusion (premix), 0.02-0.3 mcg/kg/min, Intravenous, Continuous PRN, Pilar Rios PA-C, Stopped at 07/14/23 1817    ondansetron (ZOFRAN) tablet 4 mg, 4 mg, Oral, Q6H PRN, 4 mg at 07/14/23 2138 **OR** ondansetron (ZOFRAN) injection 4 mg, 4 mg,  Intravenous, Q6H PRN, Pilar Rios PA-C, 4 mg at 07/14/23 1544    ondansetron (ZOFRAN) injection 4 mg, 4 mg, Intravenous, Q6H PRN, Pilar Rios PA-C    oxyCODONE (ROXICODONE) immediate release tablet 10 mg, 10 mg, Oral, Q4H PRN, Be Givens APRN, 10 mg at 07/15/23 0354    Pharmacy Consult - [place], , Does not apply, Daily, Pilar Rios PA-C    phenylephrine (SHANI-SYNEPHRINE) 50 mg in sodium chloride 0.9 % 250 mL infusion, 0.5-3 mcg/kg/min, Intravenous, Continuous PRN, Pilar Rios PA-C    Phosphorus Replacement - Follow Nurse / BPA Driven Protocol, , Does not apply, PRN, Pilar Rios PA-C    Potassium Replacement - Follow Nurse / BPA Driven Protocol, , Does not apply, PRN, Pilar Rios PA-C    racemic epinephrine (RACEPINEPHRINE) nebulizer solution 0.5 mL, 0.5 mL, Nebulization, Once PRN, Pilar Rios PA-C    sennosides-docusate (PERICOLACE) 8.6-50 MG per tablet 2 tablet, 2 tablet, Oral, BID, Pilar Rios PA-C, 2 tablet at 07/14/23 2137    sodium bicarbonate injection 8.4% 50 mEq, 50 mEq, Intravenous, Once PRN, Pilar Rios PA-C    Physical Exam: General well-developed male resting heart rate 112 no overt dyspnea but has tachypnea        HEENT: No JVP.  Central line present right neck/patient on Ventimask       Respiratory: Equal bilateral symmetrical expansion reduced breath sounds RONI changes on the left and crackles       Cardiovascular: Tachycardic with a rub and no edema to palpation       GI: Soft       Lower Extremities: No lesions       Neuro: Facial expression symmetric moves all 4 extremity       Skin: Warm and dry       Psych: Pleasant affect    Results Review: Intake and output are essentially even.  Heart rates 94-1 05.  Blood pressures good.  Blood work pending    Radiology Results:  Imaging Results (Last 72 Hours)       Procedure Component Value Units Date/Time    XR Chest 1 View [[id number]] Resulted: 07/15/23 0337     Updated: 07/15/23 0338    XR Chest 1 View [[id number]]  Collected: 07/14/23 0714     Updated: 07/14/23 0724    Narrative:      XR CHEST 1 VW    Date of Exam: 7/14/2023 3:35 AM EDT    Indication: Post-Op Heart Surgery    Comparison: 7/13/2023.    Findings:  Endotracheal tube tip in the mid trachea. Enteric tube is present with incomplete visualization of the tip. There is a right internal jugular catheter sheath in place. Stable left-sided chest tube present. No pneumothorax. The heart appears enlarged.   Median sternotomy wires are present. Lung volumes are low. There is mild indistinctness of the pulmonary vasculature. Mild bibasal atelectatic changes are present. Small left-sided pleural effusion present.      Impression:      Impression:  Mild pulmonary edema pattern with small left-sided pleural effusion, similar as compared to the previous study. No pneumothorax.      Electronically Signed: Grecia Mart    7/14/2023 7:21 AM EDT    Workstation ID: [id number]    XR Chest 1 View [[id number]] Collected: 07/13/23 1902     Updated: 07/13/23 1908    Narrative:      XR CHEST 1 VW    Date of Exam: 7/13/2023 6:36 PM EDT    Indication: Post-Op Check Line & Tube Placement    Comparison: Preoperative chest x-ray 7/12/2023    Findings:  There are new postoperative changes from CABG. Tip of the endotracheal tube terminates in the midthoracic trachea approximately 5 cm above the piter. Tip of the enteric tube terminates in the gastric fundus. Radiopaque marker of the intra-aortic balloon   pump is located at the level of the proximal descending thoracic aorta. There is a right internal jugular sheath in place. Chest tubes are noted.    No pneumothorax is identified on this semierect exam. There are patchy left basilar opacities.      Impression:      Impression:  1.Interval postoperative changes with positioning of support tubes and lines, as above.  2.No pneumothorax visible on this semierect exam.  3.Patchy left basilar opacities, likely due to atelectasis.      Electronically  Signed: Amber Rodgers    7/13/2023 7:05 PM EDT    Workstation ID: [id number]    XR Chest 1 View [[id number]] Collected: 07/12/23 0746     Updated: 07/12/23 0750    Narrative:      XR CHEST 1 VW    Date of Exam: 7/12/2023 3:46 AM EDT    Indication: PRE-CABG    Comparison: None available.    Findings:  Lungs are normally expanded. Mild cardiomegaly. No significant pneumothorax, pleural effusion or focal pulmonary parenchymal opacity. Bones and soft tissues are within normal limits.    Impression:      No acute cardiopulmonary abnormality.          Electronically Signed: Bela Sterling    7/12/2023 7:47 AM EDT    Workstation ID: [id number]            EKG: Sinus rhythm J-point elevation no obvious ischemia    ECHO: Preserved EF    Tele: Sinus tach    Assessment   Assessment/Plan: Hypertension-blood pressure currently well controlled but the patient's heart rate is 10 6-1 10.  Since he has some COPD I will DC metoprolol and start him on Bystolic 5 mg daily  2 hyperlipidemia continue statin  3 multivessel CAD status post CABG  4 patient has inspiratory pain and a loud rub-we will give a dose of Toradol 30 mg IV.    Florencio Hinson MD  07/15/23  04:40 EDT

## 2023-07-15 NOTE — PLAN OF CARE
Goal Outcome Evaluation:  Plan of Care Reviewed With: patient           Outcome Evaluation: vss. nitro weaned off. weaned off hfnc to 5Lnc, intermittent n/v continues.  zofran given x2 today. insulin transitioned to sq. voiding well after fc removal.

## 2023-07-15 NOTE — PROGRESS NOTES
"CARDIOTHORACIC AND VASCULAR SURGERY PROGRESS NOTE    OVERNIGHT EVENTS  Hypoxia, tachycardia      SUBJECTIVE  Pain controlled, denies nausea, vomiting, shortness of breath      OBJECTIVE  /62 (Patient Position: Lying)   Pulse 85   Temp 98.2 °F (36.8 °C) (Axillary)   Resp 20   Ht 175.3 cm (69.02\")   Wt 95.7 kg (210 lb 15.7 oz)   SpO2 93%   BMI 31.14 kg/m²   General no acute distress, pleasant, interactive  Head normocephalic, atraumatic  Eyes clear sclerae  ENT no discharge, neck supple  Mouth mucous membranes moist  Cardiac regular rate rhythm, no murmurs rubs gallops  Vascular warm well perfused x4 extremities  Pulmonary lungs clear to auscultation bilaterally  Abdomen soft nontender nondistended  Lymphatic no edema bilateral lower extremities  Neurological grossly intact  Psychological appropriate  Dermatological no lesions in sun exposed areas  Musculoskeletal normal tone and bulk    WBC   Date Value Ref Range Status   07/15/2023 18.49 (H) 3.40 - 10.80 10*3/mm3 Final     RBC   Date Value Ref Range Status   07/15/2023 3.50 (L) 4.14 - 5.80 10*6/mm3 Final     Hemoglobin   Date Value Ref Range Status   07/15/2023 10.2 (L) 13.0 - 17.7 g/dL Final     Hematocrit   Date Value Ref Range Status   07/15/2023 30.3 (L) 37.5 - 51.0 % Final     MCV   Date Value Ref Range Status   07/15/2023 86.6 79.0 - 97.0 fL Final     MCH   Date Value Ref Range Status   07/15/2023 29.1 26.6 - 33.0 pg Final     MCHC   Date Value Ref Range Status   07/15/2023 33.7 31.5 - 35.7 g/dL Final     RDW   Date Value Ref Range Status   07/15/2023 13.6 12.3 - 15.4 % Final     RDW-SD   Date Value Ref Range Status   07/15/2023 42.4 37.0 - 54.0 fl Final     MPV   Date Value Ref Range Status   07/15/2023 9.9 6.0 - 12.0 fL Final     Platelets   Date Value Ref Range Status   07/15/2023 165 140 - 450 10*3/mm3 Final     Neutrophil %   Date Value Ref Range Status   07/13/2023 66.6 42.7 - 76.0 % Final     Lymphocyte %   Date Value Ref Range Status "   07/13/2023 30.1 19.6 - 45.3 % Final     Monocyte %   Date Value Ref Range Status   07/13/2023 2.5 (L) 5.0 - 12.0 % Final     Eosinophil %   Date Value Ref Range Status   07/13/2023 0.2 (L) 0.3 - 6.2 % Final     Basophil %   Date Value Ref Range Status   07/13/2023 0.1 0.0 - 1.5 % Final     Immature Grans %   Date Value Ref Range Status   07/13/2023 0.5 0.0 - 0.5 % Final     Neutrophils, Absolute   Date Value Ref Range Status   07/13/2023 6.85 1.70 - 7.00 10*3/mm3 Final     Lymphocytes, Absolute   Date Value Ref Range Status   07/13/2023 3.10 0.70 - 3.10 10*3/mm3 Final     Monocytes, Absolute   Date Value Ref Range Status   07/13/2023 0.26 0.10 - 0.90 10*3/mm3 Final     Eosinophils, Absolute   Date Value Ref Range Status   07/13/2023 0.02 0.00 - 0.40 10*3/mm3 Final     Basophils, Absolute   Date Value Ref Range Status   07/13/2023 0.01 0.00 - 0.20 10*3/mm3 Final     Immature Grans, Absolute   Date Value Ref Range Status   07/13/2023 0.05 0.00 - 0.05 10*3/mm3 Final     nRBC   Date Value Ref Range Status   07/13/2023 0.0 0.0 - 0.2 /100 WBC Final       Basic Metabolic Panel    Sodium Sodium   Date Value Ref Range Status   07/15/2023 133 (L) 136 - 145 mmol/L Final   07/14/2023 141 136 - 145 mmol/L Final   07/13/2023 144 136 - 145 mmol/L Final   07/13/2023 144 136 - 145 mmol/L Final   07/13/2023 144 136 - 145 mmol/L Final   07/13/2023 138 136 - 145 mmol/L Final      Potassium Potassium   Date Value Ref Range Status   07/15/2023 4.2 3.5 - 5.2 mmol/L Final   07/14/2023 4.3 3.5 - 5.2 mmol/L Final     Comment:     Specimen hemolyzed.  Results may be affected.   07/13/2023 4.2 3.5 - 5.2 mmol/L Final     Comment:     Slight hemolysis detected by analyzer. Results may be affected.   07/13/2023 3.8 3.5 - 5.2 mmol/L Final     Comment:     Slight hemolysis detected by analyzer. Results may be affected.   07/13/2023 3.8 3.5 - 5.2 mmol/L Final     Comment:     Slight hemolysis detected by analyzer. Results may be affected.    07/13/2023 4.4 3.5 - 5.2 mmol/L Final      Chloride Chloride   Date Value Ref Range Status   07/15/2023 97 (L) 98 - 107 mmol/L Final   07/14/2023 106 98 - 107 mmol/L Final   07/13/2023 107 98 - 107 mmol/L Final   07/13/2023 104 98 - 107 mmol/L Final   07/13/2023 104 98 - 107 mmol/L Final   07/13/2023 101 98 - 107 mmol/L Final      Bicarbonate No results found for: PLASMABICARB   BUN BUN   Date Value Ref Range Status   07/15/2023 13 6 - 20 mg/dL Final   07/14/2023 14 6 - 20 mg/dL Final   07/13/2023 14 6 - 20 mg/dL Final   07/13/2023 13 6 - 20 mg/dL Final   07/13/2023 13 6 - 20 mg/dL Final   07/13/2023 14 6 - 20 mg/dL Final      Creatinine Creatinine   Date Value Ref Range Status   07/15/2023 0.73 (L) 0.76 - 1.27 mg/dL Final   07/14/2023 1.04 0.76 - 1.27 mg/dL Final   07/13/2023 1.12 0.76 - 1.27 mg/dL Final   07/13/2023 0.97 0.76 - 1.27 mg/dL Final   07/13/2023 0.97 0.76 - 1.27 mg/dL Final   07/13/2023 0.87 0.76 - 1.27 mg/dL Final      Calcium Calcium   Date Value Ref Range Status   07/15/2023 8.6 8.6 - 10.5 mg/dL Final   07/14/2023 8.5 (L) 8.6 - 10.5 mg/dL Final   07/13/2023 8.4 (L) 8.6 - 10.5 mg/dL Final   07/13/2023 8.8 8.6 - 10.5 mg/dL Final   07/13/2023 8.8 8.6 - 10.5 mg/dL Final   07/13/2023 9.8 8.6 - 10.5 mg/dL Final      Glucose      No components found for: GLUCOSE.*         Scheduled Meds:acetaminophen, 1,000 mg, Oral, Q8H  aspirin, 325 mg, Oral, Daily  atorvastatin, 40 mg, Oral, Nightly  furosemide, 40 mg, Intravenous, Once  gabapentin, 100 mg, Oral, TID  nebivolol, 5 mg, Oral, Q24H  pharmacy consult - [facility], , Does not apply, Daily  senna-docusate sodium, 2 tablet, Oral, BID      Continuous Infusions:dextrose 5 % with KCl 20 mEq, 30 mL/hr, Last Rate: 30 mL/hr (07/13/23 1921)  EPINEPHrine, 0.02-0.3 mcg/kg/min  EPINEPHrine, 0.02-0.3 mcg/kg/min  insulin, 0-100 Units/hr, Last Rate: 1.8 Units/hr (07/15/23 0600)  niCARdipine, 5-15 mg/hr  nitroglycerin, 5-200 mcg/min, Last Rate: 90 mcg/min (07/15/23  0847)  norepinephrine, 0.02-0.3 mcg/kg/min, Last Rate: Stopped (07/14/23 1817)  phenylephrine, 0.5-3 mcg/kg/min      PRN Meds:.  acetaminophen    albumin human    calcium carbonate    Calcium Replacement - Follow Nurse / BPA Driven Protocol    dextrose    dextrose    EPINEPHrine    glucagon (human recombinant)    ipratropium-albuterol    Magnesium Cardiology Dose Replacement - Follow Nurse / BPA Driven Protocol    Morphine    naloxone    niCARdipine    nitroglycerin    nitroglycerin    norepinephrine    ondansetron **OR** ondansetron    ondansetron    oxyCODONE    phenylephrine    Phosphorus Replacement - Follow Nurse / BPA Driven Protocol    Potassium Replacement - Follow Nurse / BPA Driven Protocol    racemic epinephrine    sodium bicarbonate      ASSESSMENT  43-year-old male with history of tobacco abuse and COPD who presented for urgent coronary artery bypass graft surgery x4 with intra-aortic balloon pump on July 13, 2023, progressing as expected      CAD (coronary artery disease) (S/P CABG x 4 w/ IABP 7/13/23)    Tobacco abuse    H/O COPD (No obstruction on pre-op venita)        PLAN  Diuresis  Aspirin  Statin      Paolo Matamoros M.D., R.P.V.I.  Cardiothoracic and Vascular Surgeon  [facility]    Paolo Matamoros MD  07/15/23  09:06 EDT

## 2023-07-15 NOTE — PROGRESS NOTES
Pulmonary/Critical Care Follow-up     LOS: 4 days   Patient Care Team:  Rigoberto Solano DO as PCP - General (Family Medicine)    Reason: Medical management of issues postoperatively after CABG including but not limited to respiratory, glycemic, and electrolyte management.      Subjective     History reviewed and updated in EMR as indicated.    Interval History:     Patient have an episode of cough this morning with subsequent dyspnea and hypoxemia and ultimately was placed on 100% high flow nasal cannula oxygen.  He also developed hypertension and was placed on nitroglycerin.  Both of these are currently being weaned.  High flow nasal cannula currently on 65% FiO2 and nitroglycerin down to 50.  He remains on insulin drip.  Currently reports he is feeling better.  No nausea or vomiting.  He does have a cough.  He has not been able to cough anything up for culture.  IABP removed yesterday.    History taken from: Patient.    PMH/FH/Social History were reviewed and updated appropriately in the electronic medical record.     Review of Systems:    Review of 14 systems was completed with positives and pertinent negatives noted in the subjective section.  All other systems reviewed and are negative.         Objective     Vital Signs  Temp:  [98 °F (36.7 °C)-100.2 °F (37.9 °C)] 98 °F (36.7 °C)  Heart Rate:  [] 85  Resp:  [20-32] 20  BP: (102-146)/(61-88) 133/67  07/14 0701 - 07/15 0700  In: 876 [P.O.:600; I.V.:276]  Out: 2180 [Urine:1500]  Body mass index is 31.14 kg/m².     IV drips:  dextrose 5 % with KCl 20 mEq, Last Rate: 30 mL/hr (07/13/23 1921)  EPINEPHrine  EPINEPHrine  insulin, Last Rate: Stopped (07/15/23 1240)  niCARdipine  nitroglycerin, Last Rate: Stopped (07/15/23 1600)  norepinephrine, Last Rate: Stopped (07/14/23 1817)  phenylephrine       Physical Exam:     Constitutional:   Alert, in no acute distress   Head:   Normocephalic, atraumatic   Eyes:           Lids and lashes normal, conjunctivae and sclerae  normal.  PER   ENMT:  Ears appear intact with no abnormalities noted     Lips normal.     Neck:  Trachea midline, no JVD   Lungs/Resp:    Normal effort, symmetric chest rise, no crepitus, diminished breath sounds diffusely.  Chest tubes without airleak.             Heart/CV:   Regular rhythm and normal rate, no murmur   Abdomen/GI:    Soft, nontender, nondistended   :    Deferred   Extremities/MSK:  No clubbing or cyanosis.  No edema.     Pulses:  Pulses palpable and equal bilaterally   Skin:  No bleeding, bruising or rash   Heme/Lymph:  No cervical or supraclavicular adenopathy.   Neurologic:    Psychiatric:    Moves all extremities with no obvious focal motor deficit.  Cranial nerves 2 - 12 grossly intact  Non-agitated, somewhat anxious appearing.    The above physical exam findings were reviewed and reflect my exam findings as of today's exam.   Electronically signed by:  Jose Newman MD  07/15/23  17:45 EDT      Results Review:     I reviewed the patient's new clinical results.   Results from last 7 days   Lab Units 07/15/23  0358 07/14/23  0359 07/13/23  2356 07/13/23  1916 07/13/23  0435 07/12/23  0801   SODIUM mmol/L 133* 141 144 144  144   < > 138   POTASSIUM mmol/L 4.2 4.3 4.2 3.8  3.8   < > 4.3   CHLORIDE mmol/L 97* 106 107 104  104   < > 103   CO2 mmol/L 24.0 23.0 24.0 23.0  23.0   < > 22.0   BUN mg/dL 13 14 14 13  13   < > 12   CREATININE mg/dL 0.73* 1.04 1.12 0.97  0.97   < > 0.80   CALCIUM mg/dL 8.6 8.5* 8.4* 8.8  8.8   < > 9.4   BILIRUBIN mg/dL  --   --   --  1.1  --  0.7   ALK PHOS U/L  --   --   --  67  --  87   ALT (SGPT) U/L  --   --   --  27  --  32   AST (SGOT) U/L  --   --   --  46*  --  23   GLUCOSE mg/dL 160* 169* 154* 125*  125*   < > 167*    < > = values in this interval not displayed.     Results from last 7 days   Lab Units 07/15/23  0358 07/14/23  0359 07/13/23  2356   WBC 10*3/mm3 18.49* 24.92* 22.66*   HEMOGLOBIN g/dL 10.2* 13.1 12.8*   HEMATOCRIT % 30.3* 38.2 37.9    PLATELETS 10*3/mm3 165 240 249     Results from last 7 days   Lab Units 07/14/23  1524   PH, ARTERIAL pH units 7.421   PO2 ART mm Hg 74.5*   PCO2, ARTERIAL mm Hg 38.5   HCO3 ART mmol/L 25.0     Results from last 7 days   Lab Units 07/14/23  0359 07/13/23  2356 07/13/23  1916   MAGNESIUM mg/dL 2.5 2.5 1.6   PHOSPHORUS mg/dL  --  2.5 6.0*       I reviewed the patient's new imaging including images and reports.    Chest x-ray shows low lung volumes with postoperative changes and no pneumothorax.    Medication Review:   acetaminophen, 1,000 mg, Oral, Q8H  aspirin, 325 mg, Oral, Daily  atorvastatin, 40 mg, Oral, Nightly  gabapentin, 100 mg, Oral, TID  insulin detemir, 8 Units, Subcutaneous, Daily  insulin lispro, 2-9 Units, Subcutaneous, 4x Daily AC & at Bedtime  nebivolol, 5 mg, Oral, Q24H  pharmacy consult - [place], , Does not apply, Daily  senna-docusate sodium, 2 tablet, Oral, BID      dextrose 5 % with KCl 20 mEq, 30 mL/hr, Last Rate: 30 mL/hr (07/13/23 1921)  EPINEPHrine, 0.02-0.3 mcg/kg/min  EPINEPHrine, 0.02-0.3 mcg/kg/min  insulin, 0-100 Units/hr, Last Rate: Stopped (07/15/23 1240)  niCARdipine, 5-15 mg/hr  nitroglycerin, 5-200 mcg/min, Last Rate: Stopped (07/15/23 1600)  norepinephrine, 0.02-0.3 mcg/kg/min, Last Rate: Stopped (07/14/23 1817)  phenylephrine, 0.5-3 mcg/kg/min        Assessment & Plan         CAD (coronary artery disease) (S/P CABG x 4 w/ IABP 7/13/23)    Tobacco abuse    H/O COPD (No obstruction on pre-op venita)    43 y.o. smoker with history of KATIE, prior diagnosis of COPD (though no obstruction on preoperative spirometry), transferred for surgical evaluation after being found to have multivessel coronary artery disease.  Patient underwent CABG x4 by Dr. Hernandez on 7/13/2023.    Patient had a event today with increasing oxygen requirement, increased blood pressures, possible pulmonary edema improved with furosemide, nitroglycerin, and high flow nasal cannula which has been weaned  significantly.    Plan:  1.  For CAD: Status post CABG x4.  Postoperative surgical management per CTS.  Aspirin, statin, beta-blocker as tolerated.  Monitor hemodynamics with fluids/diuretics/vasoactive drips as needed.  2.  For tobacco abuse: Smoking cessation counseling.  3.  Respiratory/hypoxemia: Supplemental oxygen as needed.  Diuresis as needed.  Mobilize.  Encourage incentive spirometer use.  4.  DVT prophylaxis: Limit to mechanical only while mediastinal tubes are in place.  5.  For postoperative hyperglycemia: Transition off of drip.  Start low-dose Levemir and correction insulin.  Monitor and adjust as necessary.    Patient is critically ill secondary to tenuous respiratory and hemodynamic status and has high risk of life-threatening decompensation in condition.   As such, the patient requires continuous monitoring and frequent reassessment for consideration of adjustment in management to minimize this risk.  I personally reassessed the patient multiple times today.    Critical care time : 43 minutes spent by me personally and independently.(This excludes time spent performing separately reportable procedures and services).  Critical care time includes high complexity decision making to assess, manipulate, and support vital organ system failure in this individual who has impairment of one or more vital organ systems such that there is a high probability of imminent or life threatening deterioration in the patient’s condition.    Electronically signed by:    Jose Newman MD  07/15/23  17:45 EDT      *. Please note that portions of this note were completed with [employer] - a voice recognition program.

## 2023-07-15 NOTE — THERAPY EVALUATION
Patient Name: Rm Noel  : 1980    MRN: [id number]                              Today's Date: 7/15/2023       Admit Date: 2023    Visit Dx: No diagnosis found.  Patient Active Problem List   Diagnosis    Coronary artery disease involving native heart with angina pectoris    CAD (coronary artery disease) (S/P CABG x 4 w/ IABP 23)    Tobacco abuse    H/O COPD (No obstruction on pre-op venita)     Past Medical History:   Diagnosis Date    COPD (chronic obstructive pulmonary disease)     Coronary artery disease 07/10/2023    Sleep apnea      Past Surgical History:   Procedure Laterality Date    CORONARY ARTERY BYPASS GRAFT N/A 2023    Procedure: MEDIAN STERNOTOMY, CORONARY ARTERY BYPASS GRAFTING X 4,  WITH LEFT INTERNAL MAMMARY ARTERY WITH ENDOSCOPIC VEIN HARVESTING OF GREATER SAPHENOUS VEIN, INSERTION OF AN INTRA-AORTIC BALLOON PUMP;  Surgeon: Florencio Hernandez MD;  Location: [facility];  Service: Cardiothoracic;  Laterality: N/A;    LIPOMA EXCISION      lipoma chest wall      General Information       Row Name 07/15/23 1354          Physical Therapy Time and Intention    Document Type evaluation  -KG     Mode of Treatment physical therapy  -KG       Row Name 07/15/23 0238          General Information    Patient Profile Reviewed yes  -KG     Prior Level of Function independent:;all household mobility;gait;transfer;ADL's;dressing;bathing  -KG     Existing Precautions/Restrictions cardiac;fall;oxygen therapy device and L/min;sternal;other (see comments)  HFNC  -KG     Barriers to Rehab medically complex  -KG       Row Name 07/15/23 1359          Living Environment    People in Home significant other;child(jacinda), dependent  -KG       Row Name 07/15/23 135          Home Main Entrance    Number of Stairs, Main Entrance four  -KG     Stair Railings, Main Entrance railings on both sides of stairs  -KG       Row Name 07/15/23 1355          Stairs Within Home, Primary    Number of Stairs,  Within Home, Primary none  -KG       Row Name 07/15/23 1357          Cognition    Orientation Status (Cognition) oriented x 3  -KG       Row Name 07/15/23 1357          Safety Issues, Functional Mobility    Safety Issues Affecting Function (Mobility) awareness of need for assistance;insight into deficits/self-awareness;safety precaution awareness;safety precautions follow-through/compliance  -KG     Impairments Affecting Function (Mobility) balance;coordination;endurance/activity tolerance;pain;postural/trunk control;shortness of breath;strength  -KG               User Key  (r) = Recorded By, (t) = Taken By, (c) = Cosigned By      Initials Name Provider Type    KG Jael Mittal, PT Physical Therapist                   Mobility       Row Name 07/15/23 1358          Bed Mobility    Bed Mobility supine-sit  -KG     Supine-Sit [place] (Bed Mobility) moderate assist (50% patient effort);2 person assist;verbal cues  -KG     Assistive Device (Bed Mobility) head of bed elevated  -KG     Comment, (Bed Mobility) VC's for sequencing and safe hand placement to maintain sternal precautions. Pt required assistance at trunk and BLEs.  -KG       Row Name 07/15/23 2828          Transfers    Comment, (Transfers) VC's for sequencing and safe hand placement to maintain sternal precautions. Pt denied any c/o dizziness or lightheadedness.  -KG       Row Name 07/15/23 1353          Sit-Stand Transfer    Sit-Stand [place] (Transfers) minimum assist (75% patient effort);2 person assist;verbal cues  -KG       Row Name 07/15/23 135          Gait/Stairs (Locomotion)    [place] Level (Gait) minimum assist (75% patient effort);2 person assist;1 person to manage equipment;verbal cues  chair follow  -KG     Assistive Device (Gait) other (see comments)  B UE support on tripod monitor  -KG     Distance in Feet (Gait) 150  -KG     Deviations/Abnormal Patterns (Gait) garfield decreased;stride length decreased  -KG     Bilateral  Gait Deviations forward flexed posture  -KG     Comment, (Gait/Stairs) Pt demonstrated step through gait pattern with slow garfield and decreased step length. Intermittent cues for upright posture. Pt denied any c/o dizziness throughout ambulation. VSS. Pt did not require any rest breaks. Episode of nausea/vomiting at end of ambulation. RN present and aware.  -KG               User Key  (r) = Recorded By, (t) = Taken By, (c) = Cosigned By      Initials Name Provider Type    KG Jael Mittal PT Physical Therapist                   Obj/Interventions       Row Name 07/15/23 1400          Range of Motion Comprehensive    General Range of Motion no range of motion deficits identified  -KG     Comment, General Range of Motion B LE WFL  -KG       Row Name 07/15/23 1400          Strength Comprehensive (MMT)    Comment, General Manual Muscle Testing (MMT) Assessment B LE grossly 4/5  -KG       Row Name 07/15/23 1400          Balance    Balance Assessment sitting static balance;standing static balance;standing dynamic balance  -KG     Static Sitting Balance contact guard  -KG     Position, Sitting Balance unsupported;sitting edge of bed  -KG     Static Standing Balance minimal assist  -KG     Dynamic Standing Balance minimal assist;2-person assist  -KG     Position/Device Used, Standing Balance supported  -KG       Row Name 07/15/23 1400          Sensory Assessment (Somatosensory)    Sensory Assessment (Somatosensory) LE sensation intact  -KG               User Key  (r) = Recorded By, (t) = Taken By, (c) = Cosigned By      Initials Name Provider Type    KG Jael Mittal, PT Physical Therapist                   Goals/Plan       Row Name 07/15/23 1402          Bed Mobility Goal 1 (PT)    Activity/Assistive Device (Bed Mobility Goal 1, PT) sit to supine;supine to sit  -KG     [place] Level/Cues Needed (Bed Mobility Goal 1, PT) independent  -KG     Time Frame (Bed Mobility Goal 1, PT) 2 weeks  -KG      Progress/Outcomes (Bed Mobility Goal 1, PT) goal ongoing  -KG       Row Name 07/15/23 1402          Transfer Goal 1 (PT)    Activity/Assistive Device (Transfer Goal 1, PT) sit-to-stand/stand-to-sit;bed-to-chair/chair-to-bed  -KG     [place] Level/Cues Needed (Transfer Goal 1, PT) independent  -KG     Time Frame (Transfer Goal 1, PT) 2 weeks  -KG     Progress/Outcome (Transfer Goal 1, PT) goal ongoing  -KG       Row Name 07/15/23 1402          Gait Training Goal 1 (PT)    Activity/Assistive Device (Gait Training Goal 1, PT) gait (walking locomotion)  -KG     [place] Level (Gait Training Goal 1, PT) independent  -KG     Distance (Gait Training Goal 1, PT) 400 feet  -KG     Time Frame (Gait Training Goal 1, PT) 2 weeks  -KG     Progress/Outcome (Gait Training Goal 1, PT) goal ongoing  -KG       Row Name 07/15/23 1402          Stairs Goal 1 (PT)    Activity/Assistive Device (Stairs Goal 1, PT) ascending stairs;descending stairs;using handrail, left;using handrail, right;step-to-step  -KG     [place] Level/Cues Needed (Stairs Goal 1, PT) standby assist  -KG     Number of Stairs (Stairs Goal 1, PT) 4  -KG     Time Frame (Stairs Goal 1, PT) 2 weeks  -KG     Progress/Outcome (Stairs Goal 1, PT) goal ongoing  -KG       Row Name 07/15/23 1402          Therapy Assessment/Plan (PT)    Planned Therapy Interventions (PT) balance training;bed mobility training;gait training;stair training;strengthening;transfer training  -KG               User Key  (r) = Recorded By, (t) = Taken By, (c) = Cosigned By      Initials Name Provider Type    KG Jael Mittal N, PT Physical Therapist                   Clinical Impression       Row Name 07/15/23 1400          Pain    Pretreatment Pain Rating 3/10  -KG     Posttreatment Pain Rating 5/10  -KG     Pain Location incisional  -KG     Pain Location - chest  -KG     Pain Intervention(s) Repositioned;Ambulation/increased activity  -KG       Row Name 07/15/23 1400          Plan  of Care Review    Plan of Care Reviewed With patient  -KG     Outcome Evaluation PT initial evaluation completed for pt s/p CABG x4 presenting with generalized weakness, SOA, and decreased functional mobility. Pt ambulated 150ft with Shannan x2 +1 and B UE support on tripod monitor. Chair follow for safety. Pt's decreased independence warrants PT skilled care. Recommend D/C home with assistance.  -KG       Row Name 07/15/23 1400          Therapy Assessment/Plan (PT)    Patient/Family Therapy Goals Statement (PT) return to PLOF  -KG     Rehab Potential (PT) good, to achieve stated therapy goals  -KG     Criteria for Skilled Interventions Met (PT) yes;skilled treatment is necessary  -KG     Therapy Frequency (PT) daily  -KG       Row Name 07/15/23 1400          Vital Signs    Pre Systolic BP Rehab 108  -KG     Pre Treatment Diastolic BP 62  -KG     Post Systolic BP Rehab 116  -KG     Post Treatment Diastolic BP 61  -KG     Pretreatment Heart Rate (beats/min) 84  -KG     Posttreatment Heart Rate (beats/min) 88  -KG     Pre SpO2 (%) 95  -KG     O2 Delivery Pre Treatment hi-flow  -KG     Post SpO2 (%) 91  -KG     O2 Delivery Post Treatment hi-flow  -KG     Pre Patient Position Supine  -KG     Intra Patient Position Standing  -KG     Post Patient Position Sitting  -KG       Row Name 07/15/23 1400          Positioning and Restraints    Pre-Treatment Position in bed  -KG     Post Treatment Position chair  -KG     In Chair reclined;call light within reach;encouraged to call for assist;with nsg;RUE elevated;LUE elevated;legs elevated  -KG               User Key  (r) = Recorded By, (t) = Taken By, (c) = Cosigned By      Initials Name Provider Type    KG Jael Mittal, PT Physical Therapist                   Outcome Measures       Row Name 07/15/23 1402          How much help from another person do you currently need...    Turning from your back to your side while in flat bed without using bedrails? 2  -KG     Moving from  lying on back to sitting on the side of a flat bed without bedrails? 2  -KG     Moving to and from a bed to a chair (including a wheelchair)? 3  -KG     Standing up from a chair using your arms (e.g., wheelchair, bedside chair)? 3  -KG     Climbing 3-5 steps with a railing? 2  -KG     To walk in hospital room? 3  -KG     AM-PAC 6 Clicks Score (PT) 15  -KG     Highest level of mobility 4 --> Transferred to chair/commode  -KG       Row Name 07/15/23 1402          Functional Assessment    Outcome Measure Options AM-PAC 6 Clicks Basic Mobility (PT)  -KG               User Key  (r) = Recorded By, (t) = Taken By, (c) = Cosigned By      Initials Name Provider Type    KG Jael Mittal, PT Physical Therapist                                 Physical Therapy Education       Title: PT OT SLP Therapies (In Progress)       Topic: Physical Therapy (In Progress)       Point: Mobility training (In Progress)       Learning Progress Summary             Patient Acceptance, E, NR by KG at 7/15/2023 0920                         Point: Home exercise program (Not Started)       Learner Progress:  Not documented in this visit.              Point: Body mechanics (In Progress)       Learning Progress Summary             Patient Acceptance, E, NR by KG at 7/15/2023 0920                         Point: Precautions (In Progress)       Learning Progress Summary             Patient Acceptance, E, NR by KG at 7/15/2023 0920                                         User Key       Initials Effective Dates Name Provider Type Discipline    KG 05/22/20 -  Jael Mittal PT Physical Therapist PT                  PT Recommendation and Plan  Planned Therapy Interventions (PT): balance training, bed mobility training, gait training, stair training, strengthening, transfer training  Plan of Care Reviewed With: patient  Outcome Evaluation: PT initial evaluation completed for pt s/p CABG x4 presenting with generalized weakness, SOA, and decreased  functional mobility. Pt ambulated 150ft with Shannan x2 +1 and B UE support on tripod monitor. Chair follow for safety. Pt's decreased independence warrants PT skilled care. Recommend D/C home with assistance.     Time Calculation:    PT Charges       Row Name 07/15/23 0920             Time Calculation    Start Time 0920  -KG      PT Received On 07/15/23  -KG      PT Goal Re-Cert Due Date 07/25/23  -KG         Time Calculation- PT    Total Timed Code Minutes- PT 9 minute(s)  -KG         Timed Charges    21782 - PT Therapeutic Activity Minutes 9  -KG         Untimed Charges    PT Eval/Re-eval Minutes 46  -KG         Total Minutes    Timed Charges Total Minutes 9  -KG      Untimed Charges Total Minutes 46  -KG       Total Minutes 55  -KG                User Key  (r) = Recorded By, (t) = Taken By, (c) = Cosigned By      Initials Name Provider Type    KG Jael Mittal, PT Physical Therapist                  Therapy Charges for Today       Code Description Service Date Service Provider Modifiers Qty    27958524243 HC PT THERAPEUTIC ACT EA 15 MIN 7/15/2023 Jael Mittal, PT GP 1    01932499641 HC PT EVAL MOD COMPLEXITY 4 7/15/2023 Jael Mittal, PT GP 1    39339635796 HC PT THER SUPP EA 15 MIN 7/15/2023 Jael Mittal, PT GP 4            PT G-Codes  Outcome Measure Options: AM-PAC 6 Clicks Basic Mobility (PT)  AM-PAC 6 Clicks Score (PT): 15  PT Discharge Summary  Anticipated Discharge Disposition (PT): home with assist    Judy Mittal, BHAVANI  7/15/2023

## 2023-07-15 NOTE — PLAN OF CARE
Goal Outcome Evaluation:  Plan of Care Reviewed With: patient           Outcome Evaluation: PT initial evaluation completed for pt s/p CABG x4 presenting with generalized weakness, SOA, and decreased functional mobility. Pt ambulated 150ft with Shannan x2 +1 and B UE support on tripod monitor. Chair follow for safety. Pt's decreased independence warrants PT skilled care. Recommend D/C home with assistance.      Anticipated Discharge Disposition (PT): home with assist

## 2023-07-15 NOTE — PROGRESS NOTES
2 Days Post-Op       LOS: 4 days   Patient Care Team:  Rigoberto Solano DO as PCP - General (Family Medicine)    Chief complaint: Coronary artery disease    He has been somewhat short of breath today and upon ambulating he became increasingly short of breath nauseated and diaphoretic EKGs were done  Does have a maximum temp of 101.3    Objective    Vital Signs  Temp:  [98.2 °F (36.8 °C)-101.3 °F (38.5 °C)] 98.2 °F (36.8 °C)  Heart Rate:  [] 86  Resp:  [20-32] 20  BP: ()/(54-88) 104/67  FiO2 (%):  [35 %] 35 %    Physical Exam:   General Appearance: alert, appears stated age and cooperative   Lungs: clear bilaterally   Heart: Regular rate and rhythm   Skin:  Incision c/d/i     Results     Results from last 7 days   Lab Units 07/15/23  0358   WBC 10*3/mm3 18.49*   HEMOGLOBIN g/dL 10.2*   HEMATOCRIT % 30.3*   PLATELETS 10*3/mm3 165     Results from last 7 days   Lab Units 07/15/23  0358   SODIUM mmol/L 133*   POTASSIUM mmol/L 4.2   CHLORIDE mmol/L 97*   CO2 mmol/L 24.0   BUN mg/dL 13   CREATININE mg/dL 0.73*   GLUCOSE mg/dL 160*   CALCIUM mg/dL 8.6               Assessment      CAD (coronary artery disease) (S/P CABG x 4 w/ IABP 7/13/23)    Tobacco abuse    H/O COPD (No obstruction on pre-op venita)    Temperature of 101.3  His x-ray does look like he has more opacifications and atelectasis and possibly early pneumonia  He has leukocytosis with a white count of 18,000  Plan   Leave chest tubes and pacing wires  Need to follow-up with his evaluation  He is also noted to have a loud rub with pleuritic type chest pain and he was started on Toradol 30 mg IV by cardiology      Carrington Olivas PA-C  07/15/23  10:46 EDT

## 2023-07-16 LAB
ANION GAP SERPL CALCULATED.3IONS-SCNC: 11 MMOL/L (ref 5–15)
BH BB BLOOD EXPIRATION DATE: NORMAL
BH BB BLOOD TYPE BARCODE: 9500
BH BB DISPENSE STATUS: NORMAL
BH BB PRODUCT CODE: NORMAL
BH BB UNIT NUMBER: NORMAL
BUN SERPL-MCNC: 16 MG/DL (ref 6–20)
BUN/CREAT SERPL: 23.2 (ref 7–25)
CALCIUM SPEC-SCNC: 9.1 MG/DL (ref 8.6–10.5)
CHLORIDE SERPL-SCNC: 99 MMOL/L (ref 98–107)
CO2 SERPL-SCNC: 27 MMOL/L (ref 22–29)
CREAT SERPL-MCNC: 0.69 MG/DL (ref 0.76–1.27)
CROSSMATCH INTERPRETATION: NORMAL
DEPRECATED RDW RBC AUTO: 43.9 FL (ref 37–54)
EGFRCR SERPLBLD CKD-EPI 2021: 116.3 ML/MIN/1.73
ERYTHROCYTE [DISTWIDTH] IN BLOOD BY AUTOMATED COUNT: 13.6 % (ref 12.3–15.4)
GLUCOSE BLDC GLUCOMTR-MCNC: 113 MG/DL (ref 70–130)
GLUCOSE BLDC GLUCOMTR-MCNC: 127 MG/DL (ref 70–130)
GLUCOSE BLDC GLUCOMTR-MCNC: 130 MG/DL (ref 70–130)
GLUCOSE BLDC GLUCOMTR-MCNC: 132 MG/DL (ref 70–130)
GLUCOSE SERPL-MCNC: 134 MG/DL (ref 65–99)
HCT VFR BLD AUTO: 29.5 % (ref 37.5–51)
HGB BLD-MCNC: 9.7 G/DL (ref 13–17.7)
MCH RBC QN AUTO: 28.9 PG (ref 26.6–33)
MCHC RBC AUTO-ENTMCNC: 32.9 G/DL (ref 31.5–35.7)
MCV RBC AUTO: 87.8 FL (ref 79–97)
PLATELET # BLD AUTO: 168 10*3/MM3 (ref 140–450)
PMV BLD AUTO: 10.1 FL (ref 6–12)
POTASSIUM SERPL-SCNC: 4 MMOL/L (ref 3.5–5.2)
QT INTERVAL: 362 MS
QT INTERVAL: 430 MS
QTC INTERVAL: 471 MS
QTC INTERVAL: 514 MS
RBC # BLD AUTO: 3.36 10*6/MM3 (ref 4.14–5.8)
SODIUM SERPL-SCNC: 137 MMOL/L (ref 136–145)
UNIT  ABO: NORMAL
UNIT  RH: NORMAL
WBC NRBC COR # BLD: 14.67 10*3/MM3 (ref 3.4–10.8)

## 2023-07-16 PROCEDURE — 63710000001 INSULIN DETEMIR PER 5 UNITS: Performed by: INTERNAL MEDICINE

## 2023-07-16 PROCEDURE — 82948 REAGENT STRIP/BLOOD GLUCOSE: CPT

## 2023-07-16 PROCEDURE — 99232 SBSQ HOSP IP/OBS MODERATE 35: CPT | Performed by: INTERNAL MEDICINE

## 2023-07-16 PROCEDURE — 80048 BASIC METABOLIC PNL TOTAL CA: CPT | Performed by: PHYSICIAN ASSISTANT

## 2023-07-16 PROCEDURE — 85027 COMPLETE CBC AUTOMATED: CPT | Performed by: PHYSICIAN ASSISTANT

## 2023-07-16 PROCEDURE — 25010000002 ONDANSETRON PER 1 MG: Performed by: PHYSICIAN ASSISTANT

## 2023-07-16 PROCEDURE — 99024 POSTOP FOLLOW-UP VISIT: CPT | Performed by: STUDENT IN AN ORGANIZED HEALTH CARE EDUCATION/TRAINING PROGRAM

## 2023-07-16 RX ORDER — BUMETANIDE 0.25 MG/ML
1 INJECTION INTRAMUSCULAR; INTRAVENOUS
Status: DISPENSED | OUTPATIENT
Start: 2023-07-16 | End: 2023-07-16

## 2023-07-16 RX ORDER — NEBIVOLOL 10 MG/1
10 TABLET ORAL
Status: DISCONTINUED | OUTPATIENT
Start: 2023-07-16 | End: 2023-07-18 | Stop reason: HOSPADM

## 2023-07-16 RX ADMIN — GABAPENTIN 100 MG: 100 CAPSULE ORAL at 08:27

## 2023-07-16 RX ADMIN — SENNOSIDES AND DOCUSATE SODIUM 2 TABLET: 50; 8.6 TABLET ORAL at 08:27

## 2023-07-16 RX ADMIN — INSULIN DETEMIR 8 UNITS: 100 INJECTION, SOLUTION SUBCUTANEOUS at 08:28

## 2023-07-16 RX ADMIN — BUMETANIDE 1 MG: 0.25 INJECTION, SOLUTION INTRAMUSCULAR; INTRAVENOUS at 19:29

## 2023-07-16 RX ADMIN — ONDANSETRON 4 MG: 2 INJECTION INTRAMUSCULAR; INTRAVENOUS at 04:42

## 2023-07-16 RX ADMIN — SENNOSIDES AND DOCUSATE SODIUM 2 TABLET: 50; 8.6 TABLET ORAL at 20:06

## 2023-07-16 RX ADMIN — BUMETANIDE 1 MG: 0.25 INJECTION, SOLUTION INTRAMUSCULAR; INTRAVENOUS at 14:16

## 2023-07-16 RX ADMIN — ASPIRIN 325 MG: 325 TABLET ORAL at 08:27

## 2023-07-16 RX ADMIN — GABAPENTIN 100 MG: 100 CAPSULE ORAL at 20:06

## 2023-07-16 RX ADMIN — ATORVASTATIN CALCIUM 40 MG: 40 TABLET, FILM COATED ORAL at 20:06

## 2023-07-16 RX ADMIN — ACETAMINOPHEN 1000 MG: 500 TABLET ORAL at 14:16

## 2023-07-16 RX ADMIN — GABAPENTIN 100 MG: 100 CAPSULE ORAL at 17:02

## 2023-07-16 RX ADMIN — NEBIVOLOL 10 MG: 10 TABLET ORAL at 08:27

## 2023-07-16 RX ADMIN — BUMETANIDE 1 MG: 0.25 INJECTION, SOLUTION INTRAMUSCULAR; INTRAVENOUS at 19:28

## 2023-07-16 NOTE — PROGRESS NOTES
[facility]  IP Progress Note      Chief Complaint/Reason for service: Postop management #1 hypertension #2 hyperlipidemia/2-multivessel disease status post CABG    Subjective   Subjective: The patient is sleeping in the recliner but awakens easily.  States he had some nausea and vomiting yesterday but that is subsequently resolved.  Denies any significant pain and the pain with inspiration is improved.  He denies shortness of breath    Past medical, surgical, social and family history reviewed in the patient's electronic medical record.    Objective     Vital Sign Min/Max for last 24 hours  Temp  Min: 98 °F (36.7 °C)  Max: 98.3 °F (36.8 °C)   BP  Min: 102/63  Max: 154/77   Pulse  Min: 76  Max: 110   Resp  Min: 18  Max: 32   SpO2  Min: 90 %  Max: 98 %   Flow (L/min)  Min: 4  Max: 50      Intake/Output Summary (Last 24 hours) at 7/16/2023 0505  Last data filed at 7/16/2023 0400  Gross per 24 hour   Intake 459.6 ml   Output 2040 ml   Net -1580.4 ml             Current Facility-Administered Medications:     acetaminophen (TYLENOL) tablet 1,000 mg, 1,000 mg, Oral, Q8H, Florencio Hernandez MD, 1,000 mg at 07/15/23 2029    acetaminophen (TYLENOL) tablet 650 mg, 650 mg, Oral, Q4H PRN, Pilar Rios PA-C, 650 mg at 07/14/23 1110    albumin human 5 % solution 250 mL, 250 mL, Intravenous, PRN, Pilar Rios PA-C, 250 mL at 07/14/23 1438    aspirin tablet 325 mg, 325 mg, Oral, Daily, Elgin Olson PharmD, 325 mg at 07/15/23 0847    atorvastatin (LIPITOR) tablet 40 mg, 40 mg, Oral, Nightly, Pilar Rios PA-C, 40 mg at 07/15/23 2030    calcium carbonate (TUMS) chewable tablet 500 mg (200 mg elemental), 2 tablet, Oral, TID PRN, Pilar Rios PA-C, 2 tablet at 07/12/23 2001    Calcium Replacement - Follow Nurse / BPA Driven Protocol, , Does not apply, PRN, Pilar Rios PA-C    dextrose (D50W) (25 g/50 mL) IV injection 25 g, 25 g, Intravenous, Q15 Min PRN, Jose Newman  MD    dextrose (GLUTOSE) oral gel 15 g, 15 g, Oral, Q15 Min PRN, Jose Newman MD    dextrose 5 % with KCl 20 mEq/L infusion, 30 mL/hr, Intravenous, Continuous, Pilar Rios PA-C, Last Rate: 30 mL/hr at 07/13/23 1921, 30 mL/hr at 07/13/23 1921    EPINEPHrine 10 mg in 250 mL NS infusion, 0.02-0.3 mcg/kg/min, Intravenous, Titrated, Pilar Rios PA-C    EPINEPHrine 10 mg in 250 mL NS infusion, 0.02-0.3 mcg/kg/min, Intravenous, Continuous PRN, Pilar Rios PA-C    gabapentin (NEURONTIN) capsule 100 mg, 100 mg, Oral, TID, Florencio Hernandez MD, 100 mg at 07/15/23 2029    glucagon (GLUCAGEN) injection 1 mg, 1 mg, Intramuscular, Q15 Min PRN, Jose Newman MD    insulin detemir (LEVEMIR) injection 8 Units, 8 Units, Subcutaneous, Daily, Jose Newman MD, 8 Units at 07/15/23 1208    Insulin Lispro (humaLOG) injection 2-9 Units, 2-9 Units, Subcutaneous, 4x Daily AC & at Bedtime, Jose Newman MD    ipratropium-albuterol (DUO-NEB) nebulizer solution 3 mL, 3 mL, Nebulization, Q4H PRN, Pilar Rios PA-C    Magnesium Cardiology Dose Replacement - Follow Nurse / BPA Driven Protocol, , Does not apply, PRN, Pilar Rios PA-C    morphine injection 2 mg, 2 mg, Intravenous, Q30 Min PRN, Florencio Hernandez MD, 2 mg at 07/15/23 0506    naloxone (NARCAN) injection 0.2 mg, 0.2 mg, Intravenous, PRN, Florencio Hernandez MD    nebivolol (BYSTOLIC) tablet 5 mg, 5 mg, Oral, Q24H, Florencio Hinson MD, 5 mg at 07/15/23 0524    niCARdipine (CARDENE) 25mg in 250mL NS infusion, 5-15 mg/hr, Intravenous, Continuous PRN, Pilar Rios PA-C    nitroglycerin (NITROSTAT) SL tablet 0.4 mg, 0.4 mg, Sublingual, Q5 Min PRN, Pilar Rios PA-C    nitroglycerin (TRIDIL) 200 mcg/ml infusion, 5-200 mcg/min, Intravenous, Continuous PRN, Pilar Rios PA-C, Stopped at 07/15/23 1600    norepinephrine (LEVOPHED) 8 mg in 250 mL NS infusion (premix), 0.02-0.3 mcg/kg/min, Intravenous, Continuous PRN, Pilar Rios PA-C,  Stopped at 07/14/23 1817    ondansetron (ZOFRAN) tablet 4 mg, 4 mg, Oral, Q6H PRN, 4 mg at 07/14/23 2138 **OR** ondansetron (ZOFRAN) injection 4 mg, 4 mg, Intravenous, Q6H PRN, Pilar Rios PA-C, 4 mg at 07/16/23 0442    ondansetron (ZOFRAN) injection 4 mg, 4 mg, Intravenous, Q6H PRN, Pilar Rios PA-C, 4 mg at 07/15/23 1028    oxyCODONE (ROXICODONE) immediate release tablet 10 mg, 10 mg, Oral, Q4H PRN, Be Givens APRN, 10 mg at 07/15/23 1954    Pharmacy Consult - [facility], , Does not apply, Daily, Pilar Rios PA-C    phenylephrine (SHANI-SYNEPHRINE) 50 mg in sodium chloride 0.9 % 250 mL infusion, 0.5-3 mcg/kg/min, Intravenous, Continuous PRN, Pilar Rios PA-C    Phosphorus Replacement - Follow Nurse / BPA Driven Protocol, , Does not apply, PRN, Pilar Rios PA-C    Potassium Replacement - Follow Nurse / BPA Driven Protocol, , Does not apply, PRN, Pilar Rios PA-C    sennosides-docusate (PERICOLACE) 8.6-50 MG per tablet 2 tablet, 2 tablet, Oral, BID, Pilar Rios PA-C, 2 tablet at 07/15/23 2030    Physical Exam: General pleasant well-developed male sitting in recliner not dyspneic not tachypneic current heart rate 85 with systolic blood pressure 130        HEENT: No JVP.  Nasal O2 present       Respiratory: Equal bilateral symmetrical expansion clear auscultation on the right with an isolated crackle in the left base       Cardiovascular: Regular rate and rhythm without murmur gallop or click and trace edema to palpation       GI: Soft and flat       Lower Extremities: No lesions       Neuro: Facial expressions are symmetrical moves all 4 extremities       Skin: Warm and dry with edema to palpation       Psych: Pleasant affect    Results Review: BNP was elevated yesterday at 1081.  Heart rates 80s to 90s.  Patient is a net -1.0 L since admission.    Radiology Results:  Imaging Results (Last 72 Hours)       Procedure Component Value Units Date/Time    XR Chest 1 View [[id number]] Collected:  07/15/23 0808     Updated: 07/15/23 0813    Narrative:      XR CHEST 1 VW    Date of Exam: 7/15/2023 2:55 AM EDT    Indication: Post-Op Heart Surgery    Comparison:  7/14/2023    Findings:  Interval extubation and removal of nasogastric tube. Chest mediastinal tubes are seen. Median sternotomy wires. Right internal jugular venous catheter.    Low lung volumes. Similar cardiomediastinal silhouette. Mildly increased left greater than right bibasilar opacities. No pneumothorax. Possible trace bilateral pleural effusions. No new osseous abnormalities. Visualized upper abdomen is unremarkable.      Impression:      Impression:  Mildly increased left greater than right bibasilar opacities favored to represent atelectasis over infection.    Possible trace bilateral pleural effusions.      Electronically Signed: Pedro Anderson    7/15/2023 8:09 AM EDT    Workstation ID: [id number]    XR Chest 1 View [[id number]] Collected: 07/14/23 0714     Updated: 07/14/23 0724    Narrative:      XR CHEST 1 VW    Date of Exam: 7/14/2023 3:35 AM EDT    Indication: Post-Op Heart Surgery    Comparison: 7/13/2023.    Findings:  Endotracheal tube tip in the mid trachea. Enteric tube is present with incomplete visualization of the tip. There is a right internal jugular catheter sheath in place. Stable left-sided chest tube present. No pneumothorax. The heart appears enlarged.   Median sternotomy wires are present. Lung volumes are low. There is mild indistinctness of the pulmonary vasculature. Mild bibasal atelectatic changes are present. Small left-sided pleural effusion present.      Impression:      Impression:  Mild pulmonary edema pattern with small left-sided pleural effusion, similar as compared to the previous study. No pneumothorax.      Electronically Signed: Grecia Matr    7/14/2023 7:21 AM EDT    Workstation ID: [id number]    XR Chest 1 View [[id number]] Collected: 07/13/23 1902     Updated: 07/13/23 1908    Narrative:      XR CHEST 1  VW    Date of Exam: 7/13/2023 6:36 PM EDT    Indication: Post-Op Check Line & Tube Placement    Comparison: Preoperative chest x-ray 7/12/2023    Findings:  There are new postoperative changes from CABG. Tip of the endotracheal tube terminates in the midthoracic trachea approximately 5 cm above the piter. Tip of the enteric tube terminates in the gastric fundus. Radiopaque marker of the intra-aortic balloon   pump is located at the level of the proximal descending thoracic aorta. There is a right internal jugular sheath in place. Chest tubes are noted.    No pneumothorax is identified on this semierect exam. There are patchy left basilar opacities.      Impression:      Impression:  1.Interval postoperative changes with positioning of support tubes and lines, as above.  2.No pneumothorax visible on this semierect exam.  3.Patchy left basilar opacities, likely due to atelectasis.      Electronically Signed: Amber Rodgers    7/13/2023 7:05 PM EDT    Workstation ID: [id number]            EKG: Sinus rhythm no ischemia    ECHO: Normal EF    Tele:      Assessment   Assessment/Plan: Hypertension-patient currently on Bystolic 5 mg.  Since heart rates in the 80s to 90s I will increase Bystolic to 10 mg  2 hyperlipidemia continue statin  3 severe multivessel disease status post CABG   Clinically the patient looks good this morning    Florencio Hinson MD  07/16/23  05:05 EDT

## 2023-07-16 NOTE — PROGRESS NOTES
Pulmonary/Critical Care Follow-up     LOS: 5 days   Patient Care Team:  Rigoberto Solano DO as PCP - General (Family Medicine)    Reason: Medical management of issues postoperatively after CABG including but not limited to respiratory, glycemic, and electrolyte management.      Subjective     History reviewed and updated in EMR as indicated.    Interval History:     Patient doing better this morning.  Currently on 4 L nasal cannula.  He does have some expected postoperative pain.  Chest tubes removed this morning.      History taken from: Patient.    PMH/FH/Social History were reviewed and updated appropriately in the electronic medical record.     Review of Systems:    Review of 14 systems was completed with positives and pertinent negatives noted in the subjective section.  All other systems reviewed and are negative.         Objective     Vital Signs  Temp:  [98 °F (36.7 °C)-98.2 °F (36.8 °C)] 98.2 °F (36.8 °C)  Heart Rate:  [76-98] 81  Resp:  [18-24] 20  BP: (109-154)/(65-83) 134/78  07/15 0701 - 07/16 0700  In: 183.6 [I.V.:183.6]  Out: 2410 [Urine:2150]  Body mass index is 31.14 kg/m².     IV drips:  dextrose 5 % with KCl 20 mEq, Last Rate: 30 mL/hr (07/13/23 1921)  niCARdipine  nitroglycerin, Last Rate: Stopped (07/15/23 1600)       Physical Exam:     Constitutional:   Alert, in no acute distress   Head:   Normocephalic, atraumatic   Eyes:           Lids and lashes normal, conjunctivae and sclerae normal.  PER   ENMT:  Ears appear intact with no abnormalities noted     Lips normal.     Neck:  Trachea midline, no JVD   Lungs/Resp:    Normal effort, symmetric chest rise, no crepitus, diminished breath sounds diffusely.               Heart/CV:   Regular rhythm and normal rate, no murmur   Abdomen/GI:    Soft, nontender, nondistended   :    Deferred   Extremities/MSK:  No clubbing or cyanosis.  No edema.     Pulses:  Pulses palpable and equal bilaterally   Skin:  No bleeding, bruising or rash   Heme/Lymph:  No  cervical or supraclavicular adenopathy.   Neurologic:    Psychiatric:    Moves all extremities with no obvious focal motor deficit.  Cranial nerves 2 - 12 grossly intact  Non-agitated, somewhat anxious appearing.    The above physical exam findings were reviewed and reflect my exam findings as of today's exam.   Electronically signed by:  Jose Newman MD  07/16/23  14:31 EDT      Results Review:     I reviewed the patient's new clinical results.   Results from last 7 days   Lab Units 07/16/23  0356 07/15/23  0358 07/14/23  0359 07/13/23  2356 07/13/23  1916 07/13/23  0435 07/12/23  0801   SODIUM mmol/L 137 133* 141   < > 144  144   < > 138   POTASSIUM mmol/L 4.0 4.2 4.3   < > 3.8  3.8   < > 4.3   CHLORIDE mmol/L 99 97* 106   < > 104  104   < > 103   CO2 mmol/L 27.0 24.0 23.0   < > 23.0  23.0   < > 22.0   BUN mg/dL 16 13 14   < > 13  13   < > 12   CREATININE mg/dL 0.69* 0.73* 1.04   < > 0.97  0.97   < > 0.80   CALCIUM mg/dL 9.1 8.6 8.5*   < > 8.8  8.8   < > 9.4   BILIRUBIN mg/dL  --   --   --   --  1.1  --  0.7   ALK PHOS U/L  --   --   --   --  67  --  87   ALT (SGPT) U/L  --   --   --   --  27  --  32   AST (SGOT) U/L  --   --   --   --  46*  --  23   GLUCOSE mg/dL 134* 160* 169*   < > 125*  125*   < > 167*    < > = values in this interval not displayed.     Results from last 7 days   Lab Units 07/16/23  0356 07/15/23  0358 07/14/23  0359   WBC 10*3/mm3 14.67* 18.49* 24.92*   HEMOGLOBIN g/dL 9.7* 10.2* 13.1   HEMATOCRIT % 29.5* 30.3* 38.2   PLATELETS 10*3/mm3 168 165 240     Results from last 7 days   Lab Units 07/14/23  1524   PH, ARTERIAL pH units 7.421   PO2 ART mm Hg 74.5*   PCO2, ARTERIAL mm Hg 38.5   HCO3 ART mmol/L 25.0     Results from last 7 days   Lab Units 07/14/23  0359 07/13/23  2356 07/13/23  1916   MAGNESIUM mg/dL 2.5 2.5 1.6   PHOSPHORUS mg/dL  --  2.5 6.0*     Results from last 7 days   Lab Units 07/12/23  0801   HEMOGLOBIN A1C % 5.80*         I reviewed the patient's new imaging  including images and reports.    Chest x-ray shows low lung volumes with postoperative changes and no pneumothorax.    Medication Review:   acetaminophen, 1,000 mg, Oral, Q8H  aspirin, 325 mg, Oral, Daily  atorvastatin, 40 mg, Oral, Nightly  bumetanide, 1 mg, Intravenous, Q3H  gabapentin, 100 mg, Oral, TID  insulin detemir, 8 Units, Subcutaneous, Daily  insulin lispro, 2-9 Units, Subcutaneous, 4x Daily AC & at Bedtime  nebivolol, 10 mg, Oral, Q24H  pharmacy consult - [place], , Does not apply, Daily  senna-docusate sodium, 2 tablet, Oral, BID      dextrose 5 % with KCl 20 mEq, 30 mL/hr, Last Rate: 30 mL/hr (07/13/23 1921)  niCARdipine, 5-15 mg/hr  nitroglycerin, 5-200 mcg/min, Last Rate: Stopped (07/15/23 1600)        Assessment & Plan         CAD (coronary artery disease) (S/P CABG x 4 w/ IABP 7/13/23)    Tobacco abuse    H/O COPD (No obstruction on pre-op venita)    43 y.o. smoker with history of KATIE, prior diagnosis of COPD (though no obstruction on preoperative spirometry), transferred for surgical evaluation after being found to have multivessel coronary artery disease.  Patient underwent CABG x4 by Dr. Hernandez on 7/13/2023.    Patient had a event today with increasing oxygen requirement, increased blood pressures, possible pulmonary edema improved with furosemide, nitroglycerin, and high flow nasal cannula which has been weaned significantly.    Plan:  1.  For CAD: Status post CABG x4.  Postoperative surgical management per CTS.  Aspirin, statin, beta-blocker as tolerated.  Ordered to telemetry by primary service.  2.  For tobacco abuse: Smoking cessation counseling.  3.  Respiratory/hypoxemia: Supplemental oxygen as needed.  Wean supplemental oxygen as tolerated.  Diuresis as needed.  Mobilize.  Encourage incentive spirometer use.  4.  DVT prophylaxis: Limit to mechanical only while mediastinal tubes are in place.  5.  For postoperative hyperglycemia: Continue low-dose Levemir and correction insulin.  Monitor and  adjust as necessary.      Electronically signed by:    Jose Newman MD  07/16/23  14:31 EDT      *. Please note that portions of this note were completed with [employer] - a voice recognition program.

## 2023-07-16 NOTE — PROGRESS NOTES
"CARDIOTHORACIC AND VASCULAR SURGERY PROGRESS NOTE    OVERNIGHT EVENTS  No acute events overnight, improving    SUBJECTIVE  Pain controlled, denies nausea, vomiting, shortness of breath    OBJECTIVE  /78   Pulse 98   Temp 98.2 °F (36.8 °C) (Oral)   Resp 20   Ht 175.3 cm (69.02\")   Wt 95.7 kg (210 lb 15.7 oz)   SpO2 96%   BMI 31.14 kg/m²   General no acute distress, pleasant, interactive  Head normocephalic, atraumatic  Eyes clear sclerae  ENT no discharge, neck supple  Mouth mucous membranes moist  Cardiac regular rate rhythm, no murmurs rubs gallops  Vascular warm well perfused x4 extremities  Pulmonary lungs clear to auscultation bilaterally  Abdomen soft nontender nondistended  Lymphatic no edema bilateral lower extremities  Neurological grossly intact  Psychological appropriate  Dermatological no lesions in sun exposed areas  Musculoskeletal normal tone and bulk    WBC   Date Value Ref Range Status   07/16/2023 14.67 (H) 3.40 - 10.80 10*3/mm3 Final     RBC   Date Value Ref Range Status   07/16/2023 3.36 (L) 4.14 - 5.80 10*6/mm3 Final     Hemoglobin   Date Value Ref Range Status   07/16/2023 9.7 (L) 13.0 - 17.7 g/dL Final     Hematocrit   Date Value Ref Range Status   07/16/2023 29.5 (L) 37.5 - 51.0 % Final     MCV   Date Value Ref Range Status   07/16/2023 87.8 79.0 - 97.0 fL Final     MCH   Date Value Ref Range Status   07/16/2023 28.9 26.6 - 33.0 pg Final     MCHC   Date Value Ref Range Status   07/16/2023 32.9 31.5 - 35.7 g/dL Final     RDW   Date Value Ref Range Status   07/16/2023 13.6 12.3 - 15.4 % Final     RDW-SD   Date Value Ref Range Status   07/16/2023 43.9 37.0 - 54.0 fl Final     MPV   Date Value Ref Range Status   07/16/2023 10.1 6.0 - 12.0 fL Final     Platelets   Date Value Ref Range Status   07/16/2023 168 140 - 450 10*3/mm3 Final     Neutrophil %   Date Value Ref Range Status   07/13/2023 66.6 42.7 - 76.0 % Final     Lymphocyte %   Date Value Ref Range Status   07/13/2023 30.1 19.6 " - 45.3 % Final     Monocyte %   Date Value Ref Range Status   07/13/2023 2.5 (L) 5.0 - 12.0 % Final     Eosinophil %   Date Value Ref Range Status   07/13/2023 0.2 (L) 0.3 - 6.2 % Final     Basophil %   Date Value Ref Range Status   07/13/2023 0.1 0.0 - 1.5 % Final     Immature Grans %   Date Value Ref Range Status   07/13/2023 0.5 0.0 - 0.5 % Final     Neutrophils, Absolute   Date Value Ref Range Status   07/13/2023 6.85 1.70 - 7.00 10*3/mm3 Final     Lymphocytes, Absolute   Date Value Ref Range Status   07/13/2023 3.10 0.70 - 3.10 10*3/mm3 Final     Monocytes, Absolute   Date Value Ref Range Status   07/13/2023 0.26 0.10 - 0.90 10*3/mm3 Final     Eosinophils, Absolute   Date Value Ref Range Status   07/13/2023 0.02 0.00 - 0.40 10*3/mm3 Final     Basophils, Absolute   Date Value Ref Range Status   07/13/2023 0.01 0.00 - 0.20 10*3/mm3 Final     Immature Grans, Absolute   Date Value Ref Range Status   07/13/2023 0.05 0.00 - 0.05 10*3/mm3 Final     nRBC   Date Value Ref Range Status   07/13/2023 0.0 0.0 - 0.2 /100 WBC Final       Basic Metabolic Panel    Sodium Sodium   Date Value Ref Range Status   07/16/2023 137 136 - 145 mmol/L Final   07/15/2023 133 (L) 136 - 145 mmol/L Final   07/14/2023 141 136 - 145 mmol/L Final   07/13/2023 144 136 - 145 mmol/L Final   07/13/2023 144 136 - 145 mmol/L Final   07/13/2023 144 136 - 145 mmol/L Final      Potassium Potassium   Date Value Ref Range Status   07/16/2023 4.0 3.5 - 5.2 mmol/L Final     Comment:     Slight hemolysis detected by analyzer. Results may be affected.   07/15/2023 4.2 3.5 - 5.2 mmol/L Final   07/14/2023 4.3 3.5 - 5.2 mmol/L Final     Comment:     Specimen hemolyzed.  Results may be affected.   07/13/2023 4.2 3.5 - 5.2 mmol/L Final     Comment:     Slight hemolysis detected by analyzer. Results may be affected.   07/13/2023 3.8 3.5 - 5.2 mmol/L Final     Comment:     Slight hemolysis detected by analyzer. Results may be affected.   07/13/2023 3.8 3.5 - 5.2  mmol/L Final     Comment:     Slight hemolysis detected by analyzer. Results may be affected.      Chloride Chloride   Date Value Ref Range Status   07/16/2023 99 98 - 107 mmol/L Final   07/15/2023 97 (L) 98 - 107 mmol/L Final   07/14/2023 106 98 - 107 mmol/L Final   07/13/2023 107 98 - 107 mmol/L Final   07/13/2023 104 98 - 107 mmol/L Final   07/13/2023 104 98 - 107 mmol/L Final      Bicarbonate No results found for: PLASMABICARB   BUN BUN   Date Value Ref Range Status   07/16/2023 16 6 - 20 mg/dL Final   07/15/2023 13 6 - 20 mg/dL Final   07/14/2023 14 6 - 20 mg/dL Final   07/13/2023 14 6 - 20 mg/dL Final   07/13/2023 13 6 - 20 mg/dL Final   07/13/2023 13 6 - 20 mg/dL Final      Creatinine Creatinine   Date Value Ref Range Status   07/16/2023 0.69 (L) 0.76 - 1.27 mg/dL Final   07/15/2023 0.73 (L) 0.76 - 1.27 mg/dL Final   07/14/2023 1.04 0.76 - 1.27 mg/dL Final   07/13/2023 1.12 0.76 - 1.27 mg/dL Final   07/13/2023 0.97 0.76 - 1.27 mg/dL Final   07/13/2023 0.97 0.76 - 1.27 mg/dL Final      Calcium Calcium   Date Value Ref Range Status   07/16/2023 9.1 8.6 - 10.5 mg/dL Final   07/15/2023 8.6 8.6 - 10.5 mg/dL Final   07/14/2023 8.5 (L) 8.6 - 10.5 mg/dL Final   07/13/2023 8.4 (L) 8.6 - 10.5 mg/dL Final   07/13/2023 8.8 8.6 - 10.5 mg/dL Final   07/13/2023 8.8 8.6 - 10.5 mg/dL Final      Glucose      No components found for: GLUCOSE.*         Scheduled Meds:acetaminophen, 1,000 mg, Oral, Q8H  aspirin, 325 mg, Oral, Daily  atorvastatin, 40 mg, Oral, Nightly  gabapentin, 100 mg, Oral, TID  insulin detemir, 8 Units, Subcutaneous, Daily  insulin lispro, 2-9 Units, Subcutaneous, 4x Daily AC & at Bedtime  nebivolol, 10 mg, Oral, Q24H  pharmacy consult - [place], , Does not apply, Daily  senna-docusate sodium, 2 tablet, Oral, BID      Continuous Infusions:dextrose 5 % with KCl 20 mEq, 30 mL/hr, Last Rate: 30 mL/hr (07/13/23 1921)  EPINEPHrine, 0.02-0.3 mcg/kg/min  EPINEPHrine, 0.02-0.3 mcg/kg/min  niCARdipine, 5-15  mg/hr  nitroglycerin, 5-200 mcg/min, Last Rate: Stopped (07/15/23 1600)  norepinephrine, 0.02-0.3 mcg/kg/min, Last Rate: Stopped (07/14/23 1817)  phenylephrine, 0.5-3 mcg/kg/min      PRN Meds:.  acetaminophen    albumin human    calcium carbonate    Calcium Replacement - Follow Nurse / BPA Driven Protocol    dextrose    dextrose    EPINEPHrine    glucagon (human recombinant)    ipratropium-albuterol    Magnesium Cardiology Dose Replacement - Follow Nurse / BPA Driven Protocol    Morphine    naloxone    niCARdipine    nitroglycerin    nitroglycerin    norepinephrine    ondansetron **OR** ondansetron    ondansetron    oxyCODONE    phenylephrine    Phosphorus Replacement - Follow Nurse / BPA Driven Protocol    Potassium Replacement - Follow Nurse / BPA Driven Protocol      ASSESSMENT  43-year-old male with history of tobacco abuse and COPD who presented for urgent coronary artery bypass graft surgery x4 with intra-aortic balloon pump on July 13, 2023, progressing as expected      CAD (coronary artery disease) (S/P CABG x 4 w/ IABP 7/13/23)    Tobacco abuse    H/O COPD (No obstruction on pre-op venita)    PLAN  Diuresis  Aspirin  Statin  PT out of bed  Solid diet  DC chest tubes  Telemetry transfer    Paolo Matamoros M.D., R.P.V.I.  Cardiothoracic and Vascular Surgeon  [facility]    Paolo Matamoros MD  07/16/23  08:30 EDT

## 2023-07-16 NOTE — PLAN OF CARE
Goal Outcome Evaluation:  Plan of Care Reviewed With: patient           Outcome Evaluation: Pt oriented x4.  Pt ambulated x2.  UOP 1400ml.  Chest tubes discontinued. Pt awaiting tele bed.  will continue to monitor.  at 7/16/2023 1919

## 2023-07-17 LAB
ANION GAP SERPL CALCULATED.3IONS-SCNC: 10 MMOL/L (ref 5–15)
BUN SERPL-MCNC: 20 MG/DL (ref 6–20)
BUN/CREAT SERPL: 29.9 (ref 7–25)
CALCIUM SPEC-SCNC: 9.3 MG/DL (ref 8.6–10.5)
CHLORIDE SERPL-SCNC: 101 MMOL/L (ref 98–107)
CO2 SERPL-SCNC: 28 MMOL/L (ref 22–29)
CREAT SERPL-MCNC: 0.67 MG/DL (ref 0.76–1.27)
DEPRECATED RDW RBC AUTO: 45 FL (ref 37–54)
EGFRCR SERPLBLD CKD-EPI 2021: 117.3 ML/MIN/1.73
ERYTHROCYTE [DISTWIDTH] IN BLOOD BY AUTOMATED COUNT: 13.6 % (ref 12.3–15.4)
GLUCOSE BLDC GLUCOMTR-MCNC: 111 MG/DL (ref 70–130)
GLUCOSE BLDC GLUCOMTR-MCNC: 89 MG/DL (ref 70–130)
GLUCOSE SERPL-MCNC: 112 MG/DL (ref 65–99)
HCT VFR BLD AUTO: 30.1 % (ref 37.5–51)
HGB BLD-MCNC: 9.6 G/DL (ref 13–17.7)
MCH RBC QN AUTO: 28.8 PG (ref 26.6–33)
MCHC RBC AUTO-ENTMCNC: 31.9 G/DL (ref 31.5–35.7)
MCV RBC AUTO: 90.4 FL (ref 79–97)
PLATELET # BLD AUTO: 208 10*3/MM3 (ref 140–450)
PMV BLD AUTO: 9.5 FL (ref 6–12)
POTASSIUM SERPL-SCNC: 3.7 MMOL/L (ref 3.5–5.2)
POTASSIUM SERPL-SCNC: 3.7 MMOL/L (ref 3.5–5.2)
POTASSIUM SERPL-SCNC: 3.8 MMOL/L (ref 3.5–5.2)
RBC # BLD AUTO: 3.33 10*6/MM3 (ref 4.14–5.8)
SODIUM SERPL-SCNC: 139 MMOL/L (ref 136–145)
WBC NRBC COR # BLD: 12.77 10*3/MM3 (ref 3.4–10.8)

## 2023-07-17 PROCEDURE — 99233 SBSQ HOSP IP/OBS HIGH 50: CPT | Performed by: INTERNAL MEDICINE

## 2023-07-17 PROCEDURE — 97530 THERAPEUTIC ACTIVITIES: CPT

## 2023-07-17 PROCEDURE — 99232 SBSQ HOSP IP/OBS MODERATE 35: CPT

## 2023-07-17 PROCEDURE — 25010000002 HEPARIN (PORCINE) PER 1000 UNITS: Performed by: INTERNAL MEDICINE

## 2023-07-17 PROCEDURE — 80048 BASIC METABOLIC PNL TOTAL CA: CPT | Performed by: PHYSICIAN ASSISTANT

## 2023-07-17 PROCEDURE — 84132 ASSAY OF SERUM POTASSIUM: CPT | Performed by: THORACIC SURGERY (CARDIOTHORACIC VASCULAR SURGERY)

## 2023-07-17 PROCEDURE — 0 POTASSIUM CHLORIDE 10 MEQ/100ML SOLUTION: Performed by: THORACIC SURGERY (CARDIOTHORACIC VASCULAR SURGERY)

## 2023-07-17 PROCEDURE — 85027 COMPLETE CBC AUTOMATED: CPT | Performed by: PHYSICIAN ASSISTANT

## 2023-07-17 PROCEDURE — 99024 POSTOP FOLLOW-UP VISIT: CPT | Performed by: THORACIC SURGERY (CARDIOTHORACIC VASCULAR SURGERY)

## 2023-07-17 PROCEDURE — 63710000001 INSULIN DETEMIR PER 5 UNITS: Performed by: INTERNAL MEDICINE

## 2023-07-17 PROCEDURE — 82948 REAGENT STRIP/BLOOD GLUCOSE: CPT

## 2023-07-17 RX ORDER — POTASSIUM CHLORIDE 20 MEQ/1
40 TABLET, EXTENDED RELEASE ORAL ONCE
Status: COMPLETED | OUTPATIENT
Start: 2023-07-17 | End: 2023-07-17

## 2023-07-17 RX ORDER — CLOPIDOGREL BISULFATE 75 MG/1
150 TABLET ORAL ONCE
Status: COMPLETED | OUTPATIENT
Start: 2023-07-17 | End: 2023-07-17

## 2023-07-17 RX ORDER — HEPARIN SODIUM 5000 [USP'U]/ML
5000 INJECTION, SOLUTION INTRAVENOUS; SUBCUTANEOUS EVERY 8 HOURS SCHEDULED
Status: DISCONTINUED | OUTPATIENT
Start: 2023-07-17 | End: 2023-07-18 | Stop reason: HOSPADM

## 2023-07-17 RX ORDER — CLOPIDOGREL BISULFATE 75 MG/1
75 TABLET ORAL DAILY
Status: DISCONTINUED | OUTPATIENT
Start: 2023-07-18 | End: 2023-07-18

## 2023-07-17 RX ORDER — POTASSIUM CHLORIDE 29.8 MG/ML
20 INJECTION INTRAVENOUS ONCE
Status: DISCONTINUED | OUTPATIENT
Start: 2023-07-17 | End: 2023-07-17

## 2023-07-17 RX ORDER — LEVOFLOXACIN 750 MG/1
750 TABLET ORAL
Status: DISCONTINUED | OUTPATIENT
Start: 2023-07-17 | End: 2023-07-18 | Stop reason: HOSPADM

## 2023-07-17 RX ORDER — POTASSIUM CHLORIDE 7.45 MG/ML
10 INJECTION INTRAVENOUS
Status: DISCONTINUED | OUTPATIENT
Start: 2023-07-17 | End: 2023-07-17

## 2023-07-17 RX ORDER — POTASSIUM CHLORIDE 750 MG/1
20 CAPSULE, EXTENDED RELEASE ORAL ONCE
Status: COMPLETED | OUTPATIENT
Start: 2023-07-17 | End: 2023-07-17

## 2023-07-17 RX ADMIN — GABAPENTIN 100 MG: 100 CAPSULE ORAL at 08:14

## 2023-07-17 RX ADMIN — OXYCODONE HYDROCHLORIDE 10 MG: 10 TABLET ORAL at 21:42

## 2023-07-17 RX ADMIN — NEBIVOLOL 10 MG: 10 TABLET ORAL at 08:14

## 2023-07-17 RX ADMIN — SENNOSIDES AND DOCUSATE SODIUM 2 TABLET: 50; 8.6 TABLET ORAL at 08:14

## 2023-07-17 RX ADMIN — ASPIRIN 325 MG: 325 TABLET ORAL at 08:14

## 2023-07-17 RX ADMIN — ACETAMINOPHEN 1000 MG: 500 TABLET ORAL at 21:42

## 2023-07-17 RX ADMIN — GABAPENTIN 100 MG: 100 CAPSULE ORAL at 21:43

## 2023-07-17 RX ADMIN — CLOPIDOGREL BISULFATE 150 MG: 75 TABLET ORAL at 08:14

## 2023-07-17 RX ADMIN — ACETAMINOPHEN 1000 MG: 500 TABLET ORAL at 14:41

## 2023-07-17 RX ADMIN — LEVOFLOXACIN 750 MG: 750 TABLET, FILM COATED ORAL at 12:56

## 2023-07-17 RX ADMIN — ACETAMINOPHEN 1000 MG: 500 TABLET ORAL at 05:06

## 2023-07-17 RX ADMIN — INSULIN DETEMIR 8 UNITS: 100 INJECTION, SOLUTION SUBCUTANEOUS at 08:13

## 2023-07-17 RX ADMIN — HEPARIN SODIUM 5000 UNITS: 5000 INJECTION INTRAVENOUS; SUBCUTANEOUS at 14:42

## 2023-07-17 RX ADMIN — GABAPENTIN 100 MG: 100 CAPSULE ORAL at 16:39

## 2023-07-17 RX ADMIN — HEPARIN SODIUM 5000 UNITS: 5000 INJECTION INTRAVENOUS; SUBCUTANEOUS at 21:42

## 2023-07-17 RX ADMIN — POTASSIUM CHLORIDE 40 MEQ: 1500 TABLET, EXTENDED RELEASE ORAL at 23:00

## 2023-07-17 RX ADMIN — SENNOSIDES AND DOCUSATE SODIUM 2 TABLET: 50; 8.6 TABLET ORAL at 21:43

## 2023-07-17 RX ADMIN — POTASSIUM CHLORIDE 10 MEQ: 7.46 INJECTION, SOLUTION INTRAVENOUS at 16:39

## 2023-07-17 RX ADMIN — POTASSIUM CHLORIDE 20 MEQ: 750 CAPSULE, EXTENDED RELEASE ORAL at 08:55

## 2023-07-17 RX ADMIN — OXYCODONE HYDROCHLORIDE 10 MG: 10 TABLET ORAL at 05:04

## 2023-07-17 RX ADMIN — ATORVASTATIN CALCIUM 40 MG: 40 TABLET, FILM COATED ORAL at 21:43

## 2023-07-17 NOTE — PROGRESS NOTES
"  [facility] at [facility]  PROGRESS NOTE    Date of Admission: 7/11/2023  Date of Service: 07/17/23    Primary Care Physician: Rigoberto Solano DO    Chief Complaint: follow up CAD, cardiac risk factors    Subjective      No acute events overnight. No chest pain or shortness of breath. Transferred from the ICU to telemetry. Patient ambulated and tolerated this well. Has questions about discharge medications, instructions, and follow ups.       Objective   Vitals: /77   Pulse 86   Temp 98 °F (36.7 °C) (Oral)   Resp 22   Ht 175.3 cm (69.02\")   Wt 95.7 kg (210 lb 15.7 oz)   SpO2 99%   BMI 31.14 kg/m²     Physical Exam:  GENERAL: Alert, cooperative, in no acute distress.   HEART: Regular rhythm, normal rate, and no murmurs, gallops, or rubs. Incision CDI.    LUNGS: Clear to auscultation bilaterally. No wheezing, rales or rhonchi.  EXTREMITIES: No clubbing, cyanosis, or edema noted.     Results:  Results from last 7 days   Lab Units 07/17/23  0518 07/16/23  0356 07/15/23  0358   WBC 10*3/mm3 12.77* 14.67* 18.49*   HEMOGLOBIN g/dL 9.6* 9.7* 10.2*   HEMATOCRIT % 30.1* 29.5* 30.3*   PLATELETS 10*3/mm3 208 168 165     Results from last 7 days   Lab Units 07/17/23  0518 07/16/23  0356 07/15/23  0358   SODIUM mmol/L 139 137 133*   POTASSIUM mmol/L 3.7 4.0 4.2   CHLORIDE mmol/L 101 99 97*   CO2 mmol/L 28.0 27.0 24.0   BUN mg/dL 20 16 13   CREATININE mg/dL 0.67* 0.69* 0.73*   GLUCOSE mg/dL 112* 134* 160*      Lab Results   Component Value Date    CHOL 165 07/12/2023    TRIG 143 07/12/2023    HDL 30 (L) 07/12/2023     (H) 07/12/2023    AST 46 (H) 07/13/2023    ALT 27 07/13/2023     Results from last 7 days   Lab Units 07/12/23  0801   HEMOGLOBIN A1C % 5.80*     Results from last 7 days   Lab Units 07/12/23  0801   CHOLESTEROL mg/dL 165   TRIGLYCERIDES mg/dL 143   HDL CHOL mg/dL 30*   LDL CHOL mg/dL 109*             Results from last 7 days   Lab Units 07/14/23  0448 07/13/23  1916 " 07/12/23  0801   PROTIME Seconds  --  20.5* 12.8   INR   --  1.74* 0.96   APTT seconds 33.8 63.3* 33.9         Results from last 7 days   Lab Units 07/15/23  0358   PROBNP pg/mL 1,081.0*         Intake/Output Summary (Last 24 hours) at 7/17/2023 0900  Last data filed at 7/17/2023 0500  Gross per 24 hour   Intake --   Output 2900 ml   Net -2900 ml       I personally reviewed the patient's EKG/Telemetry data    Radiology Data:   Results for orders placed during the hospital encounter of 07/11/23    Adult Transthoracic Echocardiogram Complete    Interpretation Summary    Left ventricular systolic function is normal. Left ventricular ejection fraction appears to be 61 - 65%.    Left ventricular wall thickness is consistent with mild to moderate concentric hypertrophy.      Current Medications:  acetaminophen, 1,000 mg, Oral, Q8H  aspirin, 325 mg, Oral, Daily  atorvastatin, 40 mg, Oral, Nightly  [START ON 7/18/2023] clopidogrel, 75 mg, Oral, Daily  gabapentin, 100 mg, Oral, TID  insulin detemir, 8 Units, Subcutaneous, Daily  insulin lispro, 2-9 Units, Subcutaneous, 4x Daily AC & at Bedtime  nebivolol, 10 mg, Oral, Q24H  pharmacy consult - MTM, , Does not apply, Daily  senna-docusate sodium, 2 tablet, Oral, BID      niCARdipine, 5-15 mg/hr  nitroglycerin, 5-200 mcg/min, Last Rate: Stopped (07/15/23 1600)        Assessment:  Multivessel coronary artery disease status post CABG with Dr. Hernandez  Hypertension  Dyslipidemia  Tobacco abuse    Plan:  Continue aspirin, plavix and statin for CAD.   Continue Nebivolol 10mg daily.   No ACEI/ARB due to borderline BP.   Patient to follow up with his primary cardiologist in [place] in 4-6 weeks    Grisel Sahni PA-C

## 2023-07-17 NOTE — PLAN OF CARE
Goal Outcome Evaluation:  Plan of Care Reviewed With: patient        Progress: improving  Outcome Evaluation: Pt increased ambulation distance to 550ft with CGA and B UE support on tripod monitor. Pt able to progress to SBA. Improved gait mechanics with continued forward ambulation. Pt denied any c/o pain or SOA. Continue to recommend PT skilled care and D/C home with assistance.      Anticipated Discharge Disposition (PT): home with assist

## 2023-07-17 NOTE — PLAN OF CARE
Goal Outcome Evaluation:   Pt axo x4. VSS on RA. Pt ambulated TID. Pt in chair, call light within reach.

## 2023-07-17 NOTE — THERAPY TREATMENT NOTE
Patient Name: Rm Noel  : 1980    MRN: [id number]                              Today's Date: 2023       Admit Date: 2023    Visit Dx: No diagnosis found.  Patient Active Problem List   Diagnosis    Coronary artery disease involving native heart with angina pectoris    CAD (coronary artery disease) (S/P CABG x 4 w/ IABP 23)    Tobacco abuse    H/O COPD (No obstruction on pre-op venita)     Past Medical History:   Diagnosis Date    COPD (chronic obstructive pulmonary disease)     Coronary artery disease 07/10/2023    Sleep apnea      Past Surgical History:   Procedure Laterality Date    CORONARY ARTERY BYPASS GRAFT N/A 2023    Procedure: MEDIAN STERNOTOMY, CORONARY ARTERY BYPASS GRAFTING X 4,  WITH LEFT INTERNAL MAMMARY ARTERY WITH ENDOSCOPIC VEIN HARVESTING OF GREATER SAPHENOUS VEIN, INSERTION OF AN INTRA-AORTIC BALLOON PUMP;  Surgeon: Florencio Hernandez MD;  [facility];  Service: Cardiothoracic;  Laterality: N/A;    LIPOMA EXCISION      lipoma chest wall      General Information       Row Name 23 1254          Physical Therapy Time and Intention    Document Type therapy note (daily note)  -KG     Mode of Treatment physical therapy  -KG       Row Name 23 1254          General Information    Existing Precautions/Restrictions cardiac;oxygen therapy device and L/min;sternal  -KG       Row Name 23 1254          Cognition    Orientation Status (Cognition) oriented x 3  -KG       Row Name 23 1254          Safety Issues, Functional Mobility    Safety Issues Affecting Function (Mobility) awareness of need for assistance;insight into deficits/self-awareness;safety precaution awareness;safety precautions follow-through/compliance  -KG     Impairments Affecting Function (Mobility) balance;coordination;endurance/activity tolerance;postural/trunk control;strength;shortness of breath  -KG               User Key  (r) = Recorded By, (t) = Taken By, (c) =  Cosigned By      Initials Name Provider Type    KG Jael Mittal PT Physical Therapist                   Mobility       Row Name 07/17/23 1254          Bed Mobility    Comment, (Bed Mobility) UIC  -KG       Row Name 07/17/23 1254          Transfers    Comment, (Transfers) Pt demonstrated appropriate sequencing; able to maintain sternal precautions.  -KG       Row Name 07/17/23 1254          Sit-Stand Transfer    Sit-Stand [place] (Transfers) contact guard;verbal cues  -KG       Row Name 07/17/23 1254          Gait/Stairs (Locomotion)    [place] Level (Gait) contact guard;verbal cues  progressed to SBA  -KG     Assistive Device (Gait) other (see comments)  B UE support on tripod monitor  -KG     Distance in Feet (Gait) 550  -KG     Deviations/Abnormal Patterns (Gait) garfield decreased;stride length decreased  -KG     Bilateral Gait Deviations forward flexed posture  -KG     Comment, (Gait/Stairs) Pt demonstrated step through gait pattern with slow garfield and decreased step length. VC's for upright posture. Pt demonstrated good balance and stability. Able to progress to SBA. Pt with improved gait mechanics with continued forward ambulation. Denied any c/o pain. O2 sats remained in mid to upper 90s with 2L NC.  -KG               User Key  (r) = Recorded By, (t) = Taken By, (c) = Cosigned By      Initials Name Provider Type    KG Jael Mittal PT Physical Therapist                   Obj/Interventions       Row Name 07/17/23 1256          Balance    Balance Assessment sitting static balance;standing static balance;standing dynamic balance  -KG     Static Sitting Balance independent  -KG     Position, Sitting Balance unsupported;sitting in chair  -KG     Static Standing Balance standby assist  -KG     Dynamic Standing Balance standby assist  -KG     Position/Device Used, Standing Balance supported  -KG               User Key  (r) = Recorded By, (t) = Taken By, (c) = Cosigned By      Initials  Name Provider Type    ABHIJIT Jael Mittal, PT Physical Therapist                   Goals/Plan    No documentation.                  Clinical Impression       Row Name 07/17/23 1257          Pain    Pretreatment Pain Rating 0/10 - no pain  -KG     Posttreatment Pain Rating 0/10 - no pain  -KG       Row Name 07/17/23 1257          Plan of Care Review    Plan of Care Reviewed With patient  -KG     Progress improving  -KG     Outcome Evaluation Pt increased ambulation distance to 550ft with CGA and B UE support on tripod monitor. Pt able to progress to SBA. Improved gait mechanics with continued forward ambulation. Pt denied any c/o pain or SOA. Continue to recommend PT skilled care and D/C home with assistance.  -KG       Row Name 07/17/23 1257          Vital Signs    Pre Systolic BP Rehab 112  -KG     Pre Treatment Diastolic BP 77  -KG     Post Systolic BP Rehab 113  -KG     Post Treatment Diastolic BP 70  -KG     Pretreatment Heart Rate (beats/min) 93  -KG     Posttreatment Heart Rate (beats/min) 88  -KG     Pre SpO2 (%) 94  -KG     O2 Delivery Pre Treatment supplemental O2  -KG     Post SpO2 (%) 93  -KG     O2 Delivery Post Treatment supplemental O2  -KG     Pre Patient Position Sitting  -KG     Intra Patient Position Standing  -KG     Post Patient Position Sitting  -KG       Row Name 07/17/23 1257          Positioning and Restraints    Pre-Treatment Position sitting in chair/recliner  -KG     Post Treatment Position chair  -KG     In Chair notified nsg;sitting;call light within reach;encouraged to call for assist;RUE elevated;LUE elevated  -KG               User Key  (r) = Recorded By, (t) = Taken By, (c) = Cosigned By      Initials Name Provider Type    Jael Clark, PT Physical Therapist                   Outcome Measures       Row Name 07/17/23 1258          How much help from another person do you currently need...    Turning from your back to your side while in flat bed without using bedrails?  3  -KG     Moving from lying on back to sitting on the side of a flat bed without bedrails? 3  -KG     Moving to and from a bed to a chair (including a wheelchair)? 3  -KG     Standing up from a chair using your arms (e.g., wheelchair, bedside chair)? 3  -KG     Climbing 3-5 steps with a railing? 3  -KG     To walk in hospital room? 3  -KG     AM-PAC 6 Clicks Score (PT) 18  -KG     Highest level of mobility 6 --> Walked 10 steps or more  -KG       Row Name 07/17/23 1258          Functional Assessment    Outcome Measure Options AM-PAC 6 Clicks Basic Mobility (PT)  -KG               User Key  (r) = Recorded By, (t) = Taken By, (c) = Cosigned By      Initials Name Provider Type    KG Jael Mittal, PT Physical Therapist                                 Physical Therapy Education       Title: PT OT SLP Therapies (Done)       Topic: Physical Therapy (Done)       Point: Mobility training (Done)       Learning Progress Summary             Patient Acceptance, E, NR,VU by KG at 7/17/2023 0849    Acceptance, E, NR by KG at 7/15/2023 0920                         Point: Home exercise program (Done)       Learning Progress Summary             Patient Acceptance, E, NR,VU by KG at 7/17/2023 0849                         Point: Body mechanics (Done)       Learning Progress Summary             Patient Acceptance, E, NR,VU by KG at 7/17/2023 0849    Acceptance, E, NR by KG at 7/15/2023 0920                         Point: Precautions (Done)       Learning Progress Summary             Patient Acceptance, E, NR,VU by KG at 7/17/2023 0849    Acceptance, E, NR by KG at 7/15/2023 0920                                         User Key       Initials Effective Dates Name Provider Type Discipline    KG 05/22/20 -  Jael Mittal, PT Physical Therapist PT                  PT Recommendation and Plan  Planned Therapy Interventions (PT): balance training, bed mobility training, gait training, stair training, strengthening, transfer  training  Plan of Care Reviewed With: patient  Progress: improving  Outcome Evaluation: Pt increased ambulation distance to 550ft with CGA and B UE support on tripod monitor. Pt able to progress to SBA. Improved gait mechanics with continued forward ambulation. Pt denied any c/o pain or SOA. Continue to recommend PT skilled care and D/C home with assistance.     Time Calculation:    PT Charges       Row Name 07/17/23 0849             Time Calculation    Start Time 0849  -KG      PT Received On 07/17/23  -KG      PT Goal Re-Cert Due Date 07/25/23  -KG         Time Calculation- PT    Total Timed Code Minutes- PT 25 minute(s)  -KG         Timed Charges    74987 - PT Therapeutic Activity Minutes 25  -KG         Total Minutes    Timed Charges Total Minutes 25  -KG       Total Minutes 25  -KG                User Key  (r) = Recorded By, (t) = Taken By, (c) = Cosigned By      Initials Name Provider Type    KG Jael Mittal, PT Physical Therapist                  Therapy Charges for Today       Code Description Service Date Service Provider Modifiers Qty    10916238660 HC PT THERAPEUTIC ACT EA 15 MIN 7/17/2023 Jael Mittal, PT GP 2            PT G-Codes  Outcome Measure Options: AM-PAC 6 Clicks Basic Mobility (PT)  AM-PAC 6 Clicks Score (PT): 18  PT Discharge Summary  Anticipated Discharge Disposition (PT): home with assist    Judy Mittal, BHAVANI  7/17/2023

## 2023-07-17 NOTE — PROGRESS NOTES
Pt. Referred for Phase II Cardiac Rehab. Staff discussed benefits of exercise, program protocol, and educational material provided. Teach back verified.  Permission granted from patient for staff to fax referral information to outlying program at this time.  Staff faxed referral info to  [facility].

## 2023-07-17 NOTE — PROGRESS NOTES
"INTENSIVIST   PROGRESS NOTE     Hospital:  LOS: 6 days     Chief Complaint: Postoperative management    Subjective   S     Interval History: No acute issues overnight. Afebrile and hemodynamically stable this AM. No CP, SOA, syncope, presyncope during ambulation.     The patient's relevant past medical, surgical and social history were reviewed and updated in Epic as appropriate.      ROS: Fourteen point review of system performed and negative except for that mentioned in HPI.     Objective   O     Intake/Ouptut 24 hrs (7:00AM - 6:59 AM)  Intake & Output (last 3 days)         07/14 0701  07/15 0700 07/15 0701 07/16 0700 07/16 0701 07/17 0700 07/17 0701 07/18 0700    P.O. 600       I.V. (mL/kg) 276 (2.9) 183.6 (1.9)      Blood        Other        NG/GT        IV Piggyback        Total Intake(mL/kg) 876 (9.2) 183.6 (1.9)      Urine (mL/kg/hr) 1500 (0.7) 2150 (0.9) 2900 (1.3)     Emesis/NG output  0      Chest Tube 680 260 20     Total Output 2180 2410 2920     Net -1304 -2226.4 -2920             Emesis Unmeasured Occurrence  3 x            Medications (drips):  dextrose 5 % with KCl 20 mEq, Last Rate: 30 mL/hr (07/13/23 1921)  niCARdipine  nitroglycerin, Last Rate: Stopped (07/15/23 1600)    Respiratory Support: Supplemental nasal cannula    Physical Examination:  Vital Signs: Blood pressure 112/77, pulse 86, temperature 98.3 °F (36.8 °C), temperature source Oral, resp. rate 18, height 175.3 cm (69.02\"), weight 95.7 kg (210 lb 15.7 oz), SpO2 99 %.    General: The patient appears in no acute distress. Alert, cooperative and interactive.  Chest: Clear to auscultation bilaterally, No wheezing, rhonchi, or rales. Normal work of breathing. Equal chest rise. Appropriate saturations on NC  Cardiac: Regular rhythm, normal rate, S1S2 auscultated. No murmurs, rubs or gallops.   Extremities: 1+ lower extremity edema. No clubbing or cyanosis.  Skin: No rashes, open wounds, or bruising. Warm, dry, well-perfused. Chest surgical " site appears well healing without significant erythema, swelling, bleeding or purulent drainage  Neuro: Motor power grossly intact bilaterally. Sensation intact. Speech fluid and fluent. Thought process coherent.  Psych: Alert and oriented x3. Mood stable.    Lines, Drains & Airways       Active LDAs       Name Placement date Placement time Site Days    Peripheral IV 07/16/23 Anterior;Left;Proximal Antecubital 07/16/23  --  Antecubital  1             Results from last 7 days   Lab Units 07/17/23 0518 07/16/23 0356 07/15/23  0358   WBC 10*3/mm3 12.77* 14.67* 18.49*   HEMOGLOBIN g/dL 9.6* 9.7* 10.2*   MCV fL 90.4 87.8 86.6   PLATELETS 10*3/mm3 208 168 165     Results from last 7 days   Lab Units 07/17/23 0518 07/16/23 0356 07/15/23  0358 07/14/23  0359 07/13/23  2356 07/13/23  1916   SODIUM mmol/L 139 137 133* 141 144 144  144   POTASSIUM mmol/L 3.7 4.0 4.2 4.3 4.2 3.8  3.8   CO2 mmol/L 28.0 27.0 24.0 23.0 24.0 23.0  23.0   CREATININE mg/dL 0.67* 0.69* 0.73* 1.04 1.12 0.97  0.97   GLUCOSE mg/dL 112* 134* 160* 169* 154* 125*  125*   MAGNESIUM mg/dL  --   --   --  2.5 2.5 1.6   PHOSPHORUS mg/dL  --   --   --   --  2.5 6.0*     Estimated Creatinine Clearance: 158.9 mL/min (A) (by C-G formula based on SCr of 0.67 mg/dL (L)).  Results from last 7 days   Lab Units 07/13/23  1916 07/12/23  0801   ALK PHOS U/L 67 87   BILIRUBIN mg/dL 1.1 0.7   ALT (SGPT) U/L 27 32   AST (SGOT) U/L 46* 23     Results from last 7 days   Lab Units 07/14/23  1524 07/13/23  1924 07/13/23  1806 07/13/23  1729 07/13/23  1719   PH, ARTERIAL pH units 7.421 7.292* 7.30* 7.29* 7.17*   PCO2, ARTERIAL mm Hg 38.5 50.3*  --   --   --    PO2 ART mm Hg 74.5* 182.0*  --   --   --    FIO2 % 35 100  --   --   --      Results: Reviewed.    I reviewed the patient's new laboratory and imaging results.  I independently reviewed the patient's new images.    Medications: Reviewed.    Assessment & Plan   A / P     Active Hospital Problems    Diagnosis      **CAD (coronary artery disease) (S/P CABG x 4 w/ IABP 7/13/23)     Tobacco abuse     H/O COPD (No obstruction on pre-op venita)      43M smoker with history of AKTIE, prior diagnosis of COPD (though no obstruction on preoperative spirometry), transferred for surgical evaluation after being found to have multivessel coronary artery disease.  Patient underwent CABG x4 by Dr. Hernandez on 7/13/2023.     Plan:  - For CAD: Status post CABG x4.  Postoperative surgical management per CTS.  Aspirin, statin, Plavix, beta-blocker as tolerated.  Ordered to telemetry by primary service  - For tobacco abuse: Smoking cessation counseling  - Respiratory/hypoxemia: Supplemental oxygen as needed.  Prior CXR consistent with post-op atelectasis. Wean supplemental oxygen as tolerated. On room air this AM.  Diuresis as needed.  Mobilize.  Encourage incentive spirometer use  - DVT prophylaxis: Limit to mechanical only while mediastinal tubes are in place  - For postoperative hyperglycemia: A1c normal (<6). DC low-dose Levemir (7/17). Continue correction insulin.  Monitor and adjust as necessary; QHS checks    F-PO  A-NA  S-NA  T-Heparin SQ (started on 7/17)  H-Head of bed greater than 30 degrees  U-NA  G-FSBS per unit protocol, correction dose insulin  S-NA  B-Will monitor daily and provide regimen if indicated  I-PIV  D-Levaquin (end date 7/19)    Advance Directives:   Code Status and Medical Interventions:   Ordered at: 07/13/23 1827     Code Status (Patient has no pulse and is not breathing):    CPR (Attempt to Resuscitate)     Medical Interventions (Patient has pulse or is breathing):    Full Support     High level of risk due to severe exacerbation of chronic illness and illness with threat to life or bodily function.    I conducted multidisciplinary rounds in the plan of care was discussed with the multidisciplinary team at that time. In attendance at multidisciplinary rounds was clinical pharmacist, dietitian, nursing staff and case  management.    I discussed the patient's findings and my recommendations with patient, family, nursing staff, and consulting provider    -- Horace Keller MD  Pulmonary/Critical Care    Addendum: Borderline UA. Following rounds urine culture (+) 50,000 CFU/mL Serratia marcescens. Will plan on 3x days of Levaquin for UTI.

## 2023-07-17 NOTE — PROGRESS NOTES
CTS Progress Note       LOS: 6 days   Patient Care Team:  Rigoberto Solano DO as PCP - General (Family Medicine)    Chief Complaint: CAD (coronary artery disease)    Vital Signs:  Temp:  [98.1 °F (36.7 °C)-98.4 °F (36.9 °C)] 98.3 °F (36.8 °C)  Heart Rate:  [81-98] 84  Resp:  [16-22] 18  BP: (109-142)/(69-85) 116/80    Physical Exam:       Results:     Results from last 7 days   Lab Units 07/17/23  0518   WBC 10*3/mm3 12.77*   HEMOGLOBIN g/dL 9.6*   HEMATOCRIT % 30.1*   PLATELETS 10*3/mm3 208     Results from last 7 days   Lab Units 07/17/23  0518   SODIUM mmol/L 139   POTASSIUM mmol/L 3.7   CHLORIDE mmol/L 101   CO2 mmol/L 28.0   BUN mg/dL 20   CREATININE mg/dL 0.67*   GLUCOSE mg/dL 112*   CALCIUM mg/dL 9.3           Imaging Results (Last 24 Hours)       ** No results found for the last 24 hours. **            Assessment      CAD (coronary artery disease) (S/P CABG x 4 w/ IABP 7/13/23)    Tobacco abuse    H/O COPD (No obstruction on pre-op venita)    Awaiting telemetry bed    Plan   Given additional 75 mg of Plavix today for a total of 150 mg  P2 Y 12 in the AM  Anticipate discharge home in 24 to 48 hours    Please note that portions of this note were completed with a voice recognition program. Efforts were made to edit the dictations, but occasionally words are mistranscribed.    Florencio Hernandez MD  07/17/23  06:43 EDT

## 2023-07-18 ENCOUNTER — APPOINTMENT (OUTPATIENT)
Dept: GENERAL RADIOLOGY | Facility: HOSPITAL | Age: 43
DRG: 236 | End: 2023-07-18
Payer: MEDICAID

## 2023-07-18 VITALS
RESPIRATION RATE: 18 BRPM | DIASTOLIC BLOOD PRESSURE: 79 MMHG | BODY MASS INDEX: 30.81 KG/M2 | TEMPERATURE: 98.3 F | HEIGHT: 69 IN | WEIGHT: 208 LBS | SYSTOLIC BLOOD PRESSURE: 115 MMHG | HEART RATE: 73 BPM | OXYGEN SATURATION: 94 %

## 2023-07-18 LAB
ANION GAP SERPL CALCULATED.3IONS-SCNC: 15 MMOL/L (ref 5–15)
BUN SERPL-MCNC: 18 MG/DL (ref 6–20)
BUN/CREAT SERPL: 26.5 (ref 7–25)
CALCIUM SPEC-SCNC: 9.1 MG/DL (ref 8.6–10.5)
CHLORIDE SERPL-SCNC: 101 MMOL/L (ref 98–107)
CO2 SERPL-SCNC: 21 MMOL/L (ref 22–29)
CREAT SERPL-MCNC: 0.68 MG/DL (ref 0.76–1.27)
DEPRECATED RDW RBC AUTO: 45.8 FL (ref 37–54)
EGFRCR SERPLBLD CKD-EPI 2021: 116.8 ML/MIN/1.73
ERYTHROCYTE [DISTWIDTH] IN BLOOD BY AUTOMATED COUNT: 13.7 % (ref 12.3–15.4)
GLUCOSE SERPL-MCNC: 144 MG/DL (ref 65–99)
HCT VFR BLD AUTO: 30.5 % (ref 37.5–51)
HGB BLD-MCNC: 9.7 G/DL (ref 13–17.7)
MCH RBC QN AUTO: 29.5 PG (ref 26.6–33)
MCHC RBC AUTO-ENTMCNC: 31.8 G/DL (ref 31.5–35.7)
MCV RBC AUTO: 92.7 FL (ref 79–97)
PA ADP PRP-ACNC: 179 PRU
PLATELET # BLD AUTO: 265 10*3/MM3 (ref 140–450)
PMV BLD AUTO: 9.8 FL (ref 6–12)
POTASSIUM SERPL-SCNC: 3.7 MMOL/L (ref 3.5–5.2)
RBC # BLD AUTO: 3.29 10*6/MM3 (ref 4.14–5.8)
SODIUM SERPL-SCNC: 137 MMOL/L (ref 136–145)
WBC NRBC COR # BLD: 7.76 10*3/MM3 (ref 3.4–10.8)

## 2023-07-18 PROCEDURE — 80048 BASIC METABOLIC PNL TOTAL CA: CPT | Performed by: INTERNAL MEDICINE

## 2023-07-18 PROCEDURE — 99232 SBSQ HOSP IP/OBS MODERATE 35: CPT

## 2023-07-18 PROCEDURE — 71046 X-RAY EXAM CHEST 2 VIEWS: CPT

## 2023-07-18 PROCEDURE — 99024 POSTOP FOLLOW-UP VISIT: CPT | Performed by: THORACIC SURGERY (CARDIOTHORACIC VASCULAR SURGERY)

## 2023-07-18 PROCEDURE — 85027 COMPLETE CBC AUTOMATED: CPT | Performed by: INTERNAL MEDICINE

## 2023-07-18 PROCEDURE — 85576 BLOOD PLATELET AGGREGATION: CPT | Performed by: PHYSICIAN ASSISTANT

## 2023-07-18 PROCEDURE — 25010000002 HEPARIN (PORCINE) PER 1000 UNITS: Performed by: INTERNAL MEDICINE

## 2023-07-18 RX ORDER — NEBIVOLOL 10 MG/1
10 TABLET ORAL
Qty: 90 TABLET | Refills: 0 | Status: SHIPPED | OUTPATIENT
Start: 2023-07-19

## 2023-07-18 RX ORDER — CLOPIDOGREL BISULFATE 75 MG/1
300 TABLET ORAL ONCE
Status: COMPLETED | OUTPATIENT
Start: 2023-07-18 | End: 2023-07-18

## 2023-07-18 RX ORDER — ASPIRIN 325 MG
325 TABLET ORAL DAILY
Qty: 100 TABLET | Refills: 1 | Status: SHIPPED | OUTPATIENT
Start: 2023-07-19

## 2023-07-18 RX ORDER — LEVOFLOXACIN 750 MG/1
750 TABLET ORAL
Qty: 1 TABLET | Refills: 0 | Status: SHIPPED | OUTPATIENT
Start: 2023-07-19 | End: 2023-07-20

## 2023-07-18 RX ORDER — HYDROCODONE BITARTRATE AND ACETAMINOPHEN 5; 325 MG/1; MG/1
1 TABLET ORAL EVERY 8 HOURS PRN
Qty: 18 TABLET | Refills: 0 | Status: SHIPPED | OUTPATIENT
Start: 2023-07-18 | End: 2023-08-16

## 2023-07-18 RX ORDER — CLOPIDOGREL BISULFATE 75 MG/1
75 TABLET ORAL DAILY
Status: DISCONTINUED | OUTPATIENT
Start: 2023-07-19 | End: 2023-07-18 | Stop reason: HOSPADM

## 2023-07-18 RX ORDER — CLOPIDOGREL BISULFATE 75 MG/1
75 TABLET ORAL DAILY
Qty: 30 TABLET | Refills: 5 | Status: SHIPPED | OUTPATIENT
Start: 2023-07-19

## 2023-07-18 RX ADMIN — NEBIVOLOL 10 MG: 10 TABLET ORAL at 09:21

## 2023-07-18 RX ADMIN — ASPIRIN 325 MG: 325 TABLET ORAL at 09:21

## 2023-07-18 RX ADMIN — CLOPIDOGREL BISULFATE 300 MG: 75 TABLET ORAL at 09:21

## 2023-07-18 RX ADMIN — ACETAMINOPHEN 1000 MG: 500 TABLET ORAL at 07:04

## 2023-07-18 RX ADMIN — GABAPENTIN 100 MG: 100 CAPSULE ORAL at 09:21

## 2023-07-18 RX ADMIN — HEPARIN SODIUM 5000 UNITS: 5000 INJECTION INTRAVENOUS; SUBCUTANEOUS at 07:04

## 2023-07-18 RX ADMIN — LEVOFLOXACIN 750 MG: 750 TABLET, FILM COATED ORAL at 09:21

## 2023-07-18 NOTE — PLAN OF CARE
Goal Outcome Evaluation:   Pt a/o x4, vs stable, and on RA. Pt resting in chair. Sutures removed per CT surgery. Four sutures removed from MT sites. Pt educated on Plavix precautions. Call light within reach.

## 2023-07-18 NOTE — PROGRESS NOTES
CTS Progress Note       LOS: 7 days   Patient Care Team:  Rigoberto Solano DO as PCP - General (Family Medicine)    Chief Complaint: CAD (coronary artery disease)    Vital Signs:  Temp:  [98 °F (36.7 °C)-99.2 °F (37.3 °C)] 98.6 °F (37 °C)  Heart Rate:  [70-86] 79  Resp:  [18-22] 18  BP: ()/(66-77) 114/72    Physical Exam:       Results:     Results from last 7 days   Lab Units 07/17/23  0518   WBC 10*3/mm3 12.77*   HEMOGLOBIN g/dL 9.6*   HEMATOCRIT % 30.1*   PLATELETS 10*3/mm3 208     Results from last 7 days   Lab Units 07/17/23  2038 07/17/23  1436 07/17/23  0518   SODIUM mmol/L  --   --  139   POTASSIUM mmol/L 3.7   < > 3.7   CHLORIDE mmol/L  --   --  101   CO2 mmol/L  --   --  28.0   BUN mg/dL  --   --  20   CREATININE mg/dL  --   --  0.67*   GLUCOSE mg/dL  --   --  112*   CALCIUM mg/dL  --   --  9.3    < > = values in this interval not displayed.           Imaging Results (Last 24 Hours)       ** No results found for the last 24 hours. **            Assessment      CAD (coronary artery disease) (S/P CABG x 4 w/ IABP 7/13/23)    Tobacco abuse    H/O COPD (No obstruction on pre-op venita)    Should be able for discharge later today if satisfactory with cardiology.  Has not been responding well to initial Plavix dose but will give 300 mg today.  Plavix prescription at discharge and would use med to beds to ensure patient actually has prescription filled    Plan   Plavix 300 mg p.o. now  May discharge home later today if satisfactory with cardiology  Plavix prescription for 75 mg p.o. daily with meds to beds  If discharged to follow-up my office in 2 weeks    Please note that portions of this note were completed with a voice recognition program. Efforts were made to edit the dictations, but occasionally words are mistranscribed.    Florencio Hernandez MD  07/18/23  06:49 EDT

## 2023-07-18 NOTE — CASE MANAGEMENT/SOCIAL WORK
Case Management Discharge Note      Final Note: Patient has orders for discharge. Per PT noted yesterday, patient walked 550'. Spoke with patient at bedside regarding discharge plan. Patient denies discharge needs, glad to be going home. Patient plan is to discharge home today via car with family to transport.         Selected Continued Care - Admitted Since 7/11/2023       Destination    No services have been selected for the patient.                Durable Medical Equipment    No services have been selected for the patient.                Dialysis/Infusion    No services have been selected for the patient.                Home Medical Care    No services have been selected for the patient.                Therapy    No services have been selected for the patient.                Community Resources    No services have been selected for the patient.                Community & DME    No services have been selected for the patient.                         Final Discharge Disposition Code: 01 - home or self-care

## 2023-07-18 NOTE — PROGRESS NOTES
[facility]  CLINICAL REVIEW NOTE    Patient Name: Rm Noel  : 1980  MRN: [id number]    Date of Admission: 2023  Length of Stay: 7  Attending Service:  CTS        Patient's labs, charting, and events reviewed.  Last dose of Levaquin sent to pharmacy for discharge. No further recommendations.

## 2023-07-18 NOTE — PROGRESS NOTES
"  [facility]  PROGRESS NOTE    Date of Admission: 7/11/2023  Date of Service: 07/18/23    Primary Care Physician: Rigoberto Solano DO    Chief Complaint: follow up CAD, cardiac risk factors    Subjective      No acute events overnight.  No chest pain or shortness of breath.  Patient has ambulated without difficulty.  Maintaining normal oxygen saturation without supplemental oxygen.  Wants to go home.    Objective   Vitals: /77 (BP Location: Right arm, Patient Position: Sitting)   Pulse 89   Temp 97.9 °F (36.6 °C) (Oral)   Resp 18   Ht 175.3 cm (69.02\")   Wt 94.3 kg (208 lb)   SpO2 94%   BMI 30.70 kg/m²     Physical Exam:  GENERAL: Alert, cooperative, in no acute distress.   HEART: Regular rhythm, normal rate, and no murmurs, gallops, or rubs. Incision CDI.    LUNGS: Clear to auscultation bilaterally. No wheezing, rales or rhonchi.  EXTREMITIES: No clubbing, cyanosis, or edema noted.     Results:  Results from last 7 days   Lab Units 07/17/23 0518 07/16/23  0356 07/15/23  0358   WBC 10*3/mm3 12.77* 14.67* 18.49*   HEMOGLOBIN g/dL 9.6* 9.7* 10.2*   HEMATOCRIT % 30.1* 29.5* 30.3*   PLATELETS 10*3/mm3 208 168 165       Results from last 7 days   Lab Units 07/17/23 2038 07/17/23  1436 07/17/23  0518 07/16/23  0356 07/15/23  0358   SODIUM mmol/L  --   --  139 137 133*   POTASSIUM mmol/L 3.7 3.8 3.7 4.0 4.2   CHLORIDE mmol/L  --   --  101 99 97*   CO2 mmol/L  --   --  28.0 27.0 24.0   BUN mg/dL  --   --  20 16 13   CREATININE mg/dL  --   --  0.67* 0.69* 0.73*   GLUCOSE mg/dL  --   --  112* 134* 160*        Lab Results   Component Value Date    CHOL 165 07/12/2023    TRIG 143 07/12/2023    HDL 30 (L) 07/12/2023     (H) 07/12/2023    AST 46 (H) 07/13/2023    ALT 27 07/13/2023     Results from last 7 days   Lab Units 07/12/23  0801   HEMOGLOBIN A1C % 5.80*       Results from last 7 days   Lab Units 07/12/23  0801   CHOLESTEROL mg/dL 165   TRIGLYCERIDES mg/dL 143   HDL " CHOL mg/dL 30*   LDL CHOL mg/dL 109*               Results from last 7 days   Lab Units 07/14/23  0448 07/13/23  1916 07/12/23  0801   PROTIME Seconds  --  20.5* 12.8   INR   --  1.74* 0.96   APTT seconds 33.8 63.3* 33.9           Results from last 7 days   Lab Units 07/15/23  0358   PROBNP pg/mL 1,081.0*           Intake/Output Summary (Last 24 hours) at 7/18/2023 0820  Last data filed at 7/17/2023 1000  Gross per 24 hour   Intake --   Output 600 ml   Net -600 ml         I personally reviewed the patient's EKG/Telemetry data    Radiology Data:   Results for orders placed during the hospital encounter of 07/11/23    Adult Transthoracic Echocardiogram Complete    Interpretation Summary    Left ventricular systolic function is normal. Left ventricular ejection fraction appears to be 61 - 65%.    Left ventricular wall thickness is consistent with mild to moderate concentric hypertrophy.      Current Medications:  acetaminophen, 1,000 mg, Oral, Q8H  aspirin, 325 mg, Oral, Daily  atorvastatin, 40 mg, Oral, Nightly  clopidogrel, 300 mg, Oral, Once  [START ON 7/19/2023] clopidogrel, 75 mg, Oral, Daily  gabapentin, 100 mg, Oral, TID  heparin (porcine), 5,000 Units, Subcutaneous, Q8H  insulin lispro, 2-9 Units, Subcutaneous, 4x Daily AC & at Bedtime  levoFLOXacin, 750 mg, Oral, Q24H  nebivolol, 10 mg, Oral, Q24H  pharmacy consult - [place], , Does not apply, Daily  senna-docusate sodium, 2 tablet, Oral, BID      niCARdipine, 5-15 mg/hr  nitroglycerin, 5-200 mcg/min, Last Rate: Stopped (07/15/23 1600)        Assessment:  Multivessel coronary artery disease status post CABG with Dr. Hernandez  Hypertension  Dyslipidemia  Tobacco abuse    Plan:  Continue aspirin, plavix and statin for CAD.   Continue Nebivolol 10mg daily.   No ACEI/ARB due to borderline BP.   Patient to follow up with his primary cardiologist in [place] in 4-6 weeks    Recommend checking P2y12 level in two weeks as an outpatient.     Grisel Sahni,  HU

## 2023-07-19 LAB — BACTERIA SPEC AEROBE CULT: ABNORMAL

## 2023-07-19 NOTE — DISCHARGE SUMMARY
[facility] Cardiothoracic Surgery Discharge Summary    Name:  Rm Noel  MRN Number:  [id number]  Date of Admission: 7/11/2023  Date of Discharge:  7/18/2023    Referred By: Florencio Hernandez MD  PCP: Rigoberto Solano DO  IP Care Team:  Patient Care Team:  Rigoberto Solano DO as PCP - General (Family Medicine)    Discharge Diagnosis:  CAD (coronary artery disease) [I25.10]    CAD (coronary artery disease) (S/P CABG x 4 w/ IABP 7/13/23)    Tobacco abuse    H/O COPD (No obstruction on pre-op venita)    Past Medical History:   Diagnosis Date    COPD (chronic obstructive pulmonary disease)     Coronary artery disease 07/10/2023    Sleep apnea 2023       Procedures Performed:  Procedure(s):  MEDIAN STERNOTOMY, CORONARY ARTERY BYPASS GRAFTING X 4,  WITH LEFT INTERNAL MAMMARY ARTERY WITH ENDOSCOPIC VEIN HARVESTING OF GREATER SAPHENOUS VEIN, INSERTION OF AN INTRA-AORTIC BALLOON PUMP       History of Present Illness:    Rm Noel is a 43-year-old current smoker with no past medical history that he is aware of who presents as a transfer from [facility] for coronary artery disease.  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  He started to see his PCP who ordered a cardiac work-up.  After an abnormal stress test, he was referred for cardiac cath.  Cardiac cath showed 95 to 99% proximal RCA stenosis with 100% occluded middle segment of RCA, 40% proximal stenosis of the LAD with 100% mid segment occlusion, first diagonal with 90% proximal stenosis, second obtuse marginal with 90% stenosis.  Patient was then transferred to [facility] for possible CABG with Dr. Hernandez.  Patient states that he has not had any further episodes of chest pain since his initial episode in November.  He denies any lower extremity edema.  He denies any history of radiation to the chest.  He does report a cosmetic procedure to his right chest when he  was a child.     Hospital Course:  Patient was taken to the operating room on 7/13/2023 for coronary artery bypass grafting x4 with EVH of the right greater saphenous vein and insertion of intra-aortic balloon pump with Dr. Hernandez.  Patient was sent to ICU in stable condition.  POD 1: Remains intubated and sedated.  IABP was removed. Was extubated same day without issue. On HFNC.  POD 2: Patient was given Toradol 30 mg IV due to a loud rub heard on exam. On 4L NC.  POD 3: He was given 150 mg of Plavix. Remains in ICU. No acute events. On room air.  POD 4: He was given 150 mg Plavix.  Transfer to telemetry orders were placed.  POD 5: Transferred to telemetry floor.  He was given 300 mg of Plavix.  Plans for 70 mg of Plavix starting tomorrow and daily thereafter.  Patient was discharged home in stable condition with plans for follow-up in our office in 2 weeks.  Plans for follow-up with primary cardiologist in [place] in 4 to 6 weeks.           Discharge Medications        New Medications        Instructions Start Date   aspirin 325 MG tablet  Replaces: [employer] Aspirin Low Dose 81 MG EC tablet   325 mg, Oral, Daily      clopidogrel 75 MG tablet  Commonly known as: PLAVIX   75 mg, Oral, Daily      HYDROcodone-acetaminophen 5-325 MG per tablet  Commonly known as: Norco   1 tablet, Oral, Every 8 Hours PRN      levoFLOXacin 750 MG tablet  Commonly known as: LEVAQUIN   750 mg, Oral, Every 24 Hours Scheduled      nebivolol 10 MG tablet  Commonly known as: BYSTOLIC   10 mg, Oral, Every 24 Hours Scheduled             Continue These Medications        Instructions Start Date   atorvastatin 40 MG tablet  Commonly known as: LIPITOR   40 mg, Oral, Daily             Stop These Medications      [employer] Aspirin Low Dose 81 MG EC tablet  Generic drug: aspirin  Replaced by: aspirin 325 MG tablet              Allergies:  Allergies   Allergen Reactions    Potassium Chloride In Nacl Unknown - Low Severity     History of ADR to IV potassium  but tolerates PO potassium fine       Discharge Diet:  Diet Instructions       Diet: Cardiac Diets; Healthy Heart (2-3 Na+); Regular Texture (IDDSI 7); Thin (IDDSI 0)      Discharge Diet: Cardiac Diets    Cardiac Diet: Healthy Heart (2-3 Na+)    Texture: Regular Texture (IDDSI 7)    Fluid Consistency: Thin (IDDSI 0)              Activity at Discharge:  Activity Instructions       Activity as Tolerated      Bathing Restrictions      You may shower    Type of Restriction: Bathing    Bathing Restrictions: No Tub Bath    Driving Restrictions      Type of Restriction: Driving    Driving Restrictions: No Driving While Taking Narcotics    Lifting Restrictions      Type of Restriction: Lifting    Lifting Restrictions: Lifting Restriction (Indicate Limit)    Weight Limit (Pounds): 10    Length of Lifting Restriction: until seen at next follow-up appointment            Discharge Disposition  Home or Self Care    Discharge Education:  Post-Op Education:  Patient was advised on responsible use of opioids in the post-surgical setting.  Patient was advised these medications are highly addictive.  Patient advised on proper disposal of unused opioid medication and was urged to discard any unused opioid medication. I reviewed the patient's sternal precautions and outlined the physical activity suitable for each benchmark at the 6-week 3-month and 1 year intervals.  Wound Care:  Patient educated regarding care of post-operative wounds.  Patient is to wash with plain soap and water and pat dry.  Patient is not to use salves on the incision sites.  Patient instructed regarding the signs and symptoms of infection and when to call the office with any concerns.    Special Instructions:   1.  Keep incisions clean and dry at all times. Take a shower daily. Do not take a tub bath or use hot tubs until seen by the cardiac surgeon in your follow-up visit. Clean  your body and incisions daily with Dial soap and water. Always use a clean  washcloth. Do not scrub your incision(s). Do not re-use dressings on your incision(s). Do not use any lotions, creams, oils, powders, antibiotic ointment (i.e., Neosporin), peroxide, alcohol, or iodine UNLESS told to do by the cardiac surgeon.  Patient may shower, but no tub baths until CT Surgery followup.   2.  No lifting over 10 lbs until CT Surgery follow up.  3.  No driving until released to do so by the surgeon.      Surgical Leg Precautions:   -Avoid sitting in one position or standing for long periods of time.  -Do not cross your legs.  -Keep your legs elevated while sitting  -Do not use any lotions, creams, oils, powders, antibiotic ointment (i.e. Neosporin), peroxide, alcohol, or iodine unless specifically prescribed by the cardiac surgeon.  -If elastic stockings (ÁLVARO hose) were prescribed to you, wear the stockings while you are up for at least two weeks after discharge. The stockings help decrease swelling. However, remove stockings at bedtime. Wash the stockings with mild soap and water, and make sure they are completely dry before you put them back on.    When to Call the Cardiac Surgeon:  -Increased swelling, redness, tenderness, and drainage  -Increased warmth in the skin around an incision  -Large amount of clear or pinkish drainage  -Sudden increased amount of drainage  -White, yellow, or greenish drainage  -Odor, which may be foul or sweet, coming from an incision  -An opening in your incisions  -Temperature higher than 101 degrees Fahrenheit   -Temperature higher than 100 degrees Fahrenheit two times within 24 hours  -Do not hesitate to call the cardiac surgery nurse navigator or cardiac surgeon if you have concerns or questions.     *The [facility] cardiac surgery nurse navigator is available Monday-Friday 8 a.m. to 4:30 p.m. [phone]  Call 911:  -Chest pain (pain similar to what you experienced prior to surgery)  -Fainting spells  -Bright red stool  -Vomiting bright red  blood  -Shortness of breath not relieved by rest  -Sudden numbness or weakness in arms or legs  -Sudden, severe headache  -Heart rate faster than 150 beats/minute with shortness of breath or a new irregular heart rate      Follow-up Appointments  Future Appointments   Date Time Provider Department Center   7/25/2023 11:00 AM Damaris Smith APRN [facility]   8/14/2023 11:30 AM Ayesha Elena APRN [facility]     Additional Instructions for the Follow-ups that You Need to Schedule       Call MD With Problems / Concerns   As directed      Instructions:   Please call the CT Surgery office with any questions or concerns you may have [phone]    Order Comments: Instructions: Please call the CT Surgery office with any questions or concerns you may have [phone]          Discharge Follow-up with PCP   As directed       Currently Documented PCP:    Rigoberto Solano DO    PCP Phone Number:    [phone]     Follow Up Details: Follow Up With PCP Within 7-14 Days         Discharge Follow-up with Specialty: Cardiology; 1 Month   As directed      Specialty: Cardiology    Follow Up: 1 Month         Discharge Follow-up with Specialty: Cardiothoracic Surgery   As directed      Follow Up Details: Follow Up in 2-4 Weeks    Specialty: Cardiothoracic Surgery         Discharge Follow-up with Specialty: Heart [facility] Valve Center; 1 Week   As directed      Specialty: [facility]    Follow Up: 1 Week    Follow Up Details: Follow Up With Heart & Valve Center Within 7 Days of Discharge. If Discharged on a Weekend, Schedule Follow Up For The Following Friday at 0900.         Cardiac Rehab Evaluation and Enrollment   Jul 23, 2023      Reason for Consult: TOM Glynn  07/19/23  12:52 EDT    Time: I spent 32 minutes on this discharge activity which included: face-to-face encounter with the patient, reviewing the data in the system, coordination of the care with the nursing staff as  well as consultants, documentation, and entering orders.

## 2023-07-20 LAB
QT INTERVAL: 394 MS
QTC INTERVAL: 489 MS

## 2023-07-24 NOTE — PROGRESS NOTES
Mode of visit: Video  Location of patient: Home   You have chosen to receive care through the use of telemedicine. Telemedicine enables health care providers at different locations to provide safe, effective, and convenient care through the use of technology. As with any health care service, there are risks associated with the use of telemedicine, including equipment failure, poor connections, and  issues.  Do you understand the risks and benefits of telemedicine as I have explained them to you? Yes  Have your questions regarding telemedicine been answered? Yes  Do you consent to the use of telemedicine in your medical care today? Yes    [facility]  Heart and Valve Center  Telemedicine note    This was a video enabled telemedicine encounter.  Chief Complaint  Coronary Artery Disease (S/p CABG)    Subjective      History of Present Illness {CC  Problem List  Visit  Diagnosis   Encounters  Notes  Medications  Labs  Result Review Imaging  Media :23}     Rm Noel, 43 y.o. male with past medical history significant for tobacco use, who presents to [facility] Heart and Valve clinic for Coronary Artery Disease (S/p CABG)  Patient presented to our facility on 7/11/2023 with chief complaint of coronary artery disease.  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  Primary care provider had stress test completed which was abnormal.  Patient then underwent cardiac catheterization which showed multivessel CAD.  He was transferred to our facility for evaluation of CABG.  Echocardiogram on 7/12/2023 showed EF 61 to 65%, LV wall thickness consistent with mild to moderate concentric hypertrophy On 7/13/2023, patient underwent CABG x4 with insertion of intra-aortic balloon pump.  Patient was discharged on 7/18/2023.    Since time of discharge, patient states he is improving daily.  He denies any chest pain, shortness  of air, syncope or near syncope, or palpitations.  Patient states that at 1 point since discharged he did have edema noted to his right ankle which resolved after elevation of lower extremities.  Patient states he is eating and drinking adequately.  He continues to be successful with his cessation of smoking.  Patient is unable to check vital signs including heart rate, blood pressure, and weight during time of visit.      Objective     Vital Signs:   There were no vitals filed for this visit.  There is no height or weight on file to calculate BMI.  Physical Exam  Constitutional:       General: He is not in acute distress.     Appearance: Normal appearance.   HENT:      Head: Normocephalic.   Pulmonary:      Effort: Pulmonary effort is normal. No respiratory distress.   Musculoskeletal:         General: Normal range of motion.      Cervical back: Normal range of motion.   Skin:     Comments: Midsternal incision noted.  Visualization limited due to telehealth visit.  Incision appears to be without signs or symptoms of infection.  Edges appear to be well approximated.  Visualization of lower extremity EVH sites limited by telehealth visit.   Neurological:      General: No focal deficit present.      Mental Status: He is alert and oriented to person, place, and time.   Psychiatric:         Mood and Affect: Mood normal.         Behavior: Behavior normal.        Data Reviewed:{ Labs  Result Review  Imaging  Med Tab  Media :23}   Lab Results   Component Value Date    WBC 7.76 07/18/2023    HGB 9.7 (L) 07/18/2023    HCT 30.5 (L) 07/18/2023    MCV 92.7 07/18/2023     07/18/2023      Lab Results   Component Value Date    GLUCOSE 144 (H) 07/18/2023    BUN 18 07/18/2023    CREATININE 0.68 (L) 07/18/2023    EGFR 116.8 07/18/2023    BCR 26.5 (H) 07/18/2023    K 3.7 07/18/2023    CO2 21.0 (L) 07/18/2023    CALCIUM 9.1 07/18/2023    ALBUMIN 3.7 07/13/2023    BILITOT 1.1 07/13/2023    AST 46 (H) 07/13/2023    ALT 27  07/13/2023      Adult Transthoracic Echocardiogram Complete (07/12/2023 17:10)  ECG 12 Lead (07/15/2023 04:02)  XR Chest PA & Lateral (07/18/2023 08:36)     Assessment & Plan   Assessment and Plan {CC Problem List  Visit Diagnosis  ROS  Review (Popup)  Health Maintenance  Quality  BestPractice  Medications  SmartSets  SnapShot Encounters  Media :23}     1. Coronary artery disease involving native coronary artery of native heart without angina pectoris  -Patient status post CABG x4 on 7/13  -He states he is improving gradually since time of discharge  -He has not been monitoring his weights for blood pressures at home, but states he feels well overall  -Patient encouraged to begin ambulatory blood pressure monitoring  -He was instructed to keep his follow-up appointment with CT surgery next month.  -Patient has primary cardiologist located in [place] and would like to continue follow-up with cardiology group locally.  -Patient educated to call clinic if he does start to experience weight gain, lower extremity edema, hyper or hypotension, or new onset shortness of air  -Patient has been compliant with all medications postoperatively including aspirin, Bystolic, Plavix, and Lipitor.      Follow Up {Instructions Charge Capture  Follow-up Communications :23}     Return if symptoms worsen or fail to improve.        Patient was given instructions and counseling regarding his condition or for health maintenance advice. Please see specific information pulled into the AVS if appropriate.  Patient was instructed to call the Heart [facility] Center with any questions, concerns, or worsening symptoms.    Dictated Utilizing Dragon Dictation   Please note that portions of this note were completed with a voice recognition program.   Part of this note may be an electronic transcription/translation of spoken language to printed text using the Dragon Dictation System.

## 2023-07-25 ENCOUNTER — TELEMEDICINE (OUTPATIENT)
Dept: CARDIOLOGY | Facility: HOSPITAL | Age: 43
End: 2023-07-25
Payer: MEDICAID

## 2023-07-25 DIAGNOSIS — Z72.0 TOBACCO ABUSE: ICD-10-CM

## 2023-07-25 DIAGNOSIS — I25.10 CORONARY ARTERY DISEASE INVOLVING NATIVE CORONARY ARTERY OF NATIVE HEART WITHOUT ANGINA PECTORIS: Primary | ICD-10-CM

## 2023-07-25 PROCEDURE — 1160F RVW MEDS BY RX/DR IN RCRD: CPT | Performed by: NURSE PRACTITIONER

## 2023-07-25 PROCEDURE — 99213 OFFICE O/P EST LOW 20 MIN: CPT | Performed by: NURSE PRACTITIONER

## 2023-07-25 PROCEDURE — 1159F MED LIST DOCD IN RCRD: CPT | Performed by: NURSE PRACTITIONER

## 2023-07-27 LAB
QT INTERVAL: 372 MS
QTC INTERVAL: 432 MS

## 2023-07-28 ENCOUNTER — READMISSION MANAGEMENT (OUTPATIENT)
Dept: CALL CENTER | Facility: HOSPITAL | Age: 43
End: 2023-07-28
Payer: MEDICAID

## 2023-07-28 NOTE — OUTREACH NOTE
CT Surgery Week 2 Survey      Flowsheet Row Responses   [facility] patient discharged from? [facility]   Does the patient have one of the following disease processes/diagnoses(primary or secondary)? Cardiothoracic surgery   Week 2 attempt successful? Yes   Call start time 1420   Call end time 1429   Meds reviewed with patient/caregiver? Yes   Is the patient having any side effects they believe may be caused by any medication additions or changes? No   Does the patient have all medications related to this admission filled (includes all antibiotics, pain medications, cardiac medications, etc.) Yes   Is the patient taking all medications as directed (includes completed medication regime)? Yes   Comments regarding appointments Kept appt with PCP yesterday. Kept video visit with cardiologist.   Does the patient have a primary care provider?  Yes   Does the patient have an appointment scheduled with their C/T surgeon? Yes   Has the patient kept scheduled appointments due by today? Yes   Has home health visited the patient within 72 hours of discharge? N/A   Psychosocial issues? No   Did the patient receive a copy of their discharge instructions? Yes   Nursing interventions Reviewed instructions with patient   What is the patient's perception of their health status since discharge? Improving   Nursing interventions Nurse provided patient education   Is the patient/caregiver able to teach back normal signs of recovery? Pain or discomfort at incisional site   Nursing interventions Reassured on normal signs of recovery   Is the patient /caregiver able to teach back basic post-op care? Practice cough and deep breath every 4 hours while awake, Hold pillow to support chest when coughing, Drive as instructed by MD in discharge instructions, Shower daily, No tub bath, swimming, or hot tub until instructed by MD, Use a clean wash cloth and antibacterial bar or liquid soap to clean incisions, If the steri-strips are falling  "off, it is okay to remove them. (If applicable), Lifting as instructed by MD in discharge instructions   Is the patient/caregiver able to teach back signs and symptoms of incisional infection? Increased redness, swelling or pain at the incisonal site, Increased drainage or bleeding, Incisional warmth, Pus or odor from incision, Fever   Is the patient/caregiver able to teach back steps to recovery at home? Set small, achievable goals for return to baseline health, Rest and rebuild strength, gradually increase activity, Practice good oral hygiene, Eat a well-balance diet, Make a list of questions for surgeon's appointment, Weigh daily   Is the patient /caregiver able to teach back the importance of cardiac rehab? Yes   Nursing interventions Provided education on importance of cardiac rehab   If the patient is a current smoker, are they able to teach back resources for cessation? Not a smoker  [States stopped smoking 07/10/23 \"cold turkey\". States has nicotine patches if needed.]   Is the patient/caregiver able to teach back the hierarchy of who to call/visit for symptoms/problems? PCP, Specialist, Home health nurse, Urgent Care, ED, 911 Yes   Additional teach back comments Encouraged to weigh daily-advised to notify PCP if weight gain of 2-3# over 24 hours or 5# over one week. Encouraged to monitor BP at least daily, and keep record for appts.   Week 2 call completed? Yes   Is the patient interested in additional calls from an ambulatory ?  NOTE:  applies to high risk patients requiring additional follow-up. No   Would this patient benefit from a Referral to Amb Social Work? No   Wrap up additional comments Patient states is improving. Denies any s/s of infection at incisions. Denies any edema, SOA, or chest pain. Denies any needs/concerns today.   Call end time [id number]            Joyce SNYDER - Registered Nurse  "

## 2023-08-16 ENCOUNTER — OFFICE VISIT (OUTPATIENT)
Dept: CARDIAC SURGERY | Facility: CLINIC | Age: 43
End: 2023-08-16
Payer: MEDICAID

## 2023-08-16 VITALS
DIASTOLIC BLOOD PRESSURE: 74 MMHG | WEIGHT: 209.8 LBS | OXYGEN SATURATION: 100 % | TEMPERATURE: 97.1 F | BODY MASS INDEX: 31.07 KG/M2 | SYSTOLIC BLOOD PRESSURE: 132 MMHG | HEART RATE: 58 BPM | HEIGHT: 69 IN

## 2023-08-16 DIAGNOSIS — Z95.1 S/P CABG (CORONARY ARTERY BYPASS GRAFT): Primary | ICD-10-CM

## 2023-08-16 DIAGNOSIS — I25.119 CORONARY ARTERY DISEASE INVOLVING NATIVE HEART WITH ANGINA PECTORIS, UNSPECIFIED VESSEL OR LESION TYPE: ICD-10-CM

## 2023-08-16 PROCEDURE — 1159F MED LIST DOCD IN RCRD: CPT | Performed by: THORACIC SURGERY (CARDIOTHORACIC VASCULAR SURGERY)

## 2023-08-16 PROCEDURE — 1160F RVW MEDS BY RX/DR IN RCRD: CPT | Performed by: THORACIC SURGERY (CARDIOTHORACIC VASCULAR SURGERY)

## 2023-08-16 PROCEDURE — 71046 X-RAY EXAM CHEST 2 VIEWS: CPT | Performed by: THORACIC SURGERY (CARDIOTHORACIC VASCULAR SURGERY)

## 2023-08-16 PROCEDURE — 99024 POSTOP FOLLOW-UP VISIT: CPT | Performed by: THORACIC SURGERY (CARDIOTHORACIC VASCULAR SURGERY)

## 2023-08-16 RX ORDER — IBUPROFEN 800 MG/1
400 TABLET ORAL EVERY 6 HOURS PRN
COMMUNITY
Start: 2023-08-10

## (undated) DEVICE — ADAPT/Y PERFUS DLP FML/LUER COLR/CODE/CLMP 8.9AND25.4CM

## (undated) DEVICE — TRAP FLD MINIVAC MEGADYNE 100ML

## (undated) DEVICE — AVANTI + 4F STD W/GW: Brand: AVANTI

## (undated) DEVICE — BALN IAB SENSATION F/O CONN 7F 34CC LF

## (undated) DEVICE — AVID DUAL STAGE VENOUS DRAINAGE CANNULA: Brand: AVID DUAL STAGE VENOUS DRAINAGE CANNULA

## (undated) DEVICE — SUT SILK 4/0 TIES 18IN A183H

## (undated) DEVICE — NDL PERC 1PRT THNWALL W/BASEPLT 18G 7CM

## (undated) DEVICE — BLD SCLPL BEAVR MINI STR 2BVL 180D LF

## (undated) DEVICE — LEVEL SENSORS PADS ARE USED TO ATTACH THE LEVEL SENSORS TO A HARD SHELL RESERVOIR. INCLUDES COUPLING GEL.: Brand: TERUMO® ADVANCED PERFUSION SYSTEM 1

## (undated) DEVICE — PAD ARMBRD SURG CONVOL 7.5X20X2IN

## (undated) DEVICE — SUT SILK 0/0 CT2 18IN C027D

## (undated) DEVICE — SUT PROLN 4/0 SH D/A 36IN 8521H

## (undated) DEVICE — CONNECT Y INTERSEPT W/LL 3/8 X 3/8 X 3/8IN

## (undated) DEVICE — TOWEL,OR,DSP,ST,BLUE,STD,4/PK,20PK/CS: Brand: MEDLINE

## (undated) DEVICE — TBG SXN RIGD MINI/SUCKER 9F 4.75IN

## (undated) DEVICE — GLV SURG BIOGEL LTX PF 8

## (undated) DEVICE — PK PERFUS CUST W/CARDIOPLEGIA

## (undated) DEVICE — SYS VASOVIEW HEMOPRO ENDOSCOPIC HARVST VESL

## (undated) DEVICE — SUT SILK 2 SUTUPAK TIE 60IN SA8H 2STRAND

## (undated) DEVICE — 3M™ MEDIPORE™ H SOFT CLOTH SURGICAL TAPE, 2863, 3 IN X 10 YD, 12/CASE: Brand: 3M™ MEDIPORE™

## (undated) DEVICE — Device

## (undated) DEVICE — ELECTRD BLD EZ CLN STD 2.5IN

## (undated) DEVICE — PATIENT RETURN ELECTRODE, SINGLE-USE, CONTACT QUALITY MONITORING, ADULT, WITH 9FT CORD, FOR PATIENTS WEIGING OVER 33LBS. (15KG): Brand: MEGADYNE

## (undated) DEVICE — STERILE PVP: Brand: MEDLINE INDUSTRIES, INC.

## (undated) DEVICE — ANTIBACTERIAL UNDYED BRAIDED (POLYGLACTIN 910), SYNTHETIC ABSORBABLE SUTURE: Brand: COATED VICRYL

## (undated) DEVICE — CANN VESL DLP 1WY BLNT/TP 3MM

## (undated) DEVICE — TBG SXN INTRACARD RIDGID FLUT 24F .25X13IN A/

## (undated) DEVICE — OASIS DRAIN, SINGLE, INLINE & ATS COMPATIBLE: Brand: OASIS

## (undated) DEVICE — PINNACLE INTRODUCER SHEATH: Brand: PINNACLE

## (undated) DEVICE — SUT PROLN 4/0 RB1 D/A 36IN 8557H

## (undated) DEVICE — SUT PDS 1 CTX 36IN VIO PDP371T

## (undated) DEVICE — GLV SURG BIOGEL LTX PF 7 1/2

## (undated) DEVICE — CANN AORT ROOT DLP VNT 14G 7F

## (undated) DEVICE — CLTH CLENS READYCLEANSE PERI CARE PK/5

## (undated) DEVICE — SUCTION CANISTER 2500CC: Brand: DEROYAL

## (undated) DEVICE — CATHETER,URETHRAL,REDRUBBER,STERILE,22FR: Brand: MEDLINE

## (undated) DEVICE — 12 FOOT DISPOSABLE EXTENSION CABLE WITH SAFE CONNECT / SCREW-DOWN

## (undated) DEVICE — SUT PROLN 6/0 C1 D/A 30IN 8706H

## (undated) DEVICE — PK ATS CUST W CARDIOTOMY RESEVOIR

## (undated) DEVICE — PENCL ROCKRSWCH MEGADYNE W/HOLSTR 10FT SS

## (undated) DEVICE — Device: Brand: PERFECTCUT ROTATING AORTIC PUNCH

## (undated) DEVICE — SENSR CERBRL O2 PK/2

## (undated) DEVICE — SUT PROLN 3/0 SH D/A 36IN 8522H

## (undated) DEVICE — TUBING, SUCTION, 1/4" X 10', STRAIGHT: Brand: MEDLINE

## (undated) DEVICE — FLTR RESERV PERFUS INTERSEPT 02 STRL

## (undated) DEVICE — TBG PENCL TELESCP MEGADYNE SMOKE EVAC 10FT

## (undated) DEVICE — SUT PROLN 7/0 CV BV1 24IN 8304H BX/36

## (undated) DEVICE — PK HEART OPN 10

## (undated) DEVICE — EZ GLIDE AORTIC CANNULA: Brand: EDWARDS LIFESCIENCES EZ GLIDE AORTIC CANNULA

## (undated) DEVICE — CVR PROB ULTRASND/TRANSD W/GEL LNG 18X250CM STRL